# Patient Record
Sex: FEMALE | Race: BLACK OR AFRICAN AMERICAN | Employment: UNEMPLOYED | URBAN - METROPOLITAN AREA
[De-identification: names, ages, dates, MRNs, and addresses within clinical notes are randomized per-mention and may not be internally consistent; named-entity substitution may affect disease eponyms.]

---

## 2019-11-14 ENCOUNTER — OFFICE VISIT (OUTPATIENT)
Dept: CARDIOLOGY CLINIC | Age: 64
End: 2019-11-14

## 2019-11-14 VITALS
SYSTOLIC BLOOD PRESSURE: 113 MMHG | OXYGEN SATURATION: 99 % | BODY MASS INDEX: 22.49 KG/M2 | HEIGHT: 65 IN | DIASTOLIC BLOOD PRESSURE: 78 MMHG | HEART RATE: 99 BPM | WEIGHT: 135 LBS

## 2019-11-14 DIAGNOSIS — R00.2 PALPITATIONS: Primary | ICD-10-CM

## 2019-11-14 DIAGNOSIS — I48.91 ATRIAL FIBRILLATION, UNSPECIFIED TYPE (HCC): ICD-10-CM

## 2019-11-14 RX ORDER — GLUCOSAMINE SULFATE 1500 MG
POWDER IN PACKET (EA) ORAL DAILY
COMMUNITY
End: 2020-10-29

## 2019-11-14 RX ORDER — METOPROLOL TARTRATE 25 MG/1
12.5 TABLET, FILM COATED ORAL 2 TIMES DAILY
Qty: 30 TAB | Refills: 3 | Status: SHIPPED | OUTPATIENT
Start: 2019-11-14 | End: 2019-12-09 | Stop reason: ALTCHOICE

## 2019-11-14 RX ORDER — LEVOCETIRIZINE DIHYDROCHLORIDE 5 MG/1
TABLET, FILM COATED ORAL
COMMUNITY

## 2019-11-14 NOTE — PROGRESS NOTES
HISTORY OF PRESENT ILLNESS  Onesimo Pritchett is a 71 y.o. female. Palpitations    The history is provided by the patient. This is a new problem. The current episode started more than 1 week ago (7/19). The problem has been gradually worsening (few weeks). The problem occurs daily. On average, each episode lasts 2 minutes. The problem is associated with nothing (ADLs). Associated symptoms include diaphoresis (occasional mild), nausea, dizziness and shortness of breath. Pertinent negatives include no fever, no malaise/fatigue, no chest pain, no claudication, no orthopnea, no PND, no vomiting, no headaches and no cough. Review of Systems   Constitutional: Positive for diaphoresis (occasional mild). Negative for chills, fever, malaise/fatigue and weight loss. HENT: Negative for nosebleeds. Eyes: Negative for discharge. Respiratory: Positive for shortness of breath. Negative for cough and wheezing. Cardiovascular: Positive for palpitations. Negative for chest pain, orthopnea, claudication, leg swelling and PND. Gastrointestinal: Positive for nausea. Negative for diarrhea and vomiting. Genitourinary: Negative for dysuria and hematuria. Musculoskeletal: Negative for joint pain. Skin: Negative for rash. Neurological: Positive for dizziness. Negative for seizures, loss of consciousness and headaches. Endo/Heme/Allergies: Negative for polydipsia. Does not bruise/bleed easily. Psychiatric/Behavioral: Negative for depression and substance abuse. The patient does not have insomnia. No Known Allergies    No past medical history on file.     Family History   Problem Relation Age of Onset    Stroke Brother 47    Heart Attack Neg Hx        Social History     Tobacco Use    Smoking status: Never Smoker    Smokeless tobacco: Never Used   Substance Use Topics    Alcohol use: Not Currently     Frequency: Never    Drug use: Not Currently        Current Outpatient Medications   Medication Sig    levocetirizine (XYZAL) 5 mg tablet Take  by mouth.  cholecalciferol (VITAMIN D3) 1,000 unit cap Take  by mouth daily. No current facility-administered medications for this visit. Past Surgical History:   Procedure Laterality Date    HX LAP CHOLECYSTECTOMY         Visit Vitals  /78 (BP 1 Location: Left arm, BP Patient Position: Sitting)   Pulse 99   Ht 5' 5\" (1.651 m)   Wt 61.2 kg (135 lb)   SpO2 99%   BMI 22.47 kg/m²       Diagnostic Studies:  I have reviewed the relevant tests done on the patient and show as follows  EKG tracings reviewed by me today. EKG Results     Procedure 720 Value Units Date/Time    AMB POC EKG ROUTINE W/ 12 LEADS, INTER & REP [[id number]]     Order Status:  Sent         XR Results (most recent):  No results found for this or any previous visit. No flowsheet data found. Ms. Alexandr Baez has a reminder for a \"due or due soon\" health maintenance. I have asked that she contact her primary care provider for follow-up on this health maintenance. Physical Exam   Constitutional: She is oriented to person, place, and time. She appears well-developed and well-nourished. No distress. HENT:   Head: Normocephalic and atraumatic. Mouth/Throat: Normal dentition. Eyes: Right eye exhibits no discharge. Left eye exhibits no discharge. No scleral icterus. Neck: Neck supple. No JVD present. Carotid bruit is not present. No thyromegaly present. Cardiovascular: Normal rate, regular rhythm, S1 normal, S2 normal, normal heart sounds and intact distal pulses. Exam reveals no gallop and no friction rub. No murmur heard. Pulmonary/Chest: Effort normal and breath sounds normal. She has no wheezes. She has no rales. Abdominal: Soft. She exhibits no mass. There is no tenderness. Musculoskeletal: She exhibits no edema. Lymphadenopathy:        Right cervical: No superficial cervical adenopathy present.        Left cervical: No superficial cervical adenopathy present. Neurological: She is alert and oriented to person, place, and time. Skin: Skin is warm and dry. No rash noted. Psychiatric: She has a normal mood and affect. Her behavior is normal.       ASSESSMENT and PLAN    Atrial Fibrillation CHADSVASC2 Score Stroke Risk:   71 y.o. 72 to 76  +1   female Female +1   CHF HX: No    + 0   HTN HX: No    + 0   Stroke/TIA/Thromboembolism No    +0   Vascular Disease HX: No    + 0   Diabetes Mellitus No    + 0   CHADSVASC 2 Score 2      Annual Stroke Risk 2.2%- moderate-high      Has chronic intermittent and possibly persistent atrial fibrillation for last 3 months or so clinically. Intermittent symptoms of shortness of breath nausea mild sweating. Will get cardiac work-up with echo and a stress test.  Start low-dose beta-blockers to control the rate and Eliquis to reduce the risk of stroke. Discussed with patient the risk of bleeding on anticoagulation. Patient understands. Patient also understands the reduced risk of stroke with anticoagulation due to atrial fibrillation. Diagnoses and all orders for this visit:    1. Palpitations  -     AMB POC EKG ROUTINE W/ 12 LEADS, INTER & REP    2. Atrial fibrillation, unspecified type (HCC)  -     metoprolol tartrate (LOPRESSOR) 25 mg tablet; Take 0.5 Tabs by mouth two (2) times a day. -     ECHO ADULT COMPLETE; Future  -     NUCLEAR CARDIAC STRESS TEST; Future  -     apixaban (ELIQUIS) 5 mg tablet; Take 1 Tab by mouth two (2) times a day. Pertinent laboratory and test data reviewed and discussed with patient. See patient instructions also for other medical advice given    There are no discontinued medications.     Follow-up and Dispositions    · Return in about 1 month (around 12/14/2019), or if symptoms worsen or fail to improve, for post test.

## 2019-11-14 NOTE — PROGRESS NOTES
1. Have you been to the ER, urgent care clinic since your last visit? Hospitalized since your last visit? No  When:  Where:  Reason for visit:     2. Have you seen or consulted any other health care providers outside of the [address] [place] [address] since your last visit? Include any pap smears or colon screening. No     3. Since your last visit, have you had any of the following symptoms?      palpitations, shortness of breath and dizziness. Explain: sx have been constant lately    4. Have you had any blood work, X-rays or cardiac testing? No     Requested: NO     [facility]    5. Where do you normally have your labs drawn?   [facility]    6. Do you need any refills today?    no

## 2019-11-14 NOTE — PATIENT INSTRUCTIONS
There are no discontinued medications. After the recommended changes have been made in blood pressure medicines, patient advised to keep BP/HR(pulse rate) chart twice daily and bring us results in next 2 weeks or so. Patient may send the results via \"My Chart\" if desired. Please rest for 5-10 minutes before checking blood pressure Atrial Fibrillation: Care Instructions Your Care Instructions Atrial fibrillation is an irregular and often fast heartbeat. Treating this condition is important for several reasons. It can cause blood clots, which can travel from your heart to your brain and cause a stroke. If you have a fast heartbeat, you may feel lightheaded, dizzy, and weak. An irregular heartbeat can also increase your risk for heart failure. Atrial fibrillation is often the result of another heart condition, such as high blood pressure or coronary artery disease. Making changes to improve your heart condition will help you stay healthy and active. Follow-up care is a key part of your treatment and safety. Be sure to make and go to all appointments, and call your doctor if you are having problems. It's also a good idea to know your test results and keep a list of the medicines you take. How can you care for yourself at home? Medicines 
  · Take your medicines exactly as prescribed. Call your doctor if you think you are having a problem with your medicine. You will get more details on the specific medicines your doctor prescribes.  
  · If your doctor has given you a blood thinner to prevent a stroke, be sure you get instructions about how to take your medicine safely. Blood thinners can cause serious bleeding problems.  
  · Do not take any vitamins, over-the-counter drugs, or herbal products without talking to your doctor first.  
 Lifestyle changes 
  · Do not smoke. Smoking can increase your chance of a stroke and heart attack.  If you need help quitting, talk to your doctor about stop-smoking programs and medicines. These can increase your chances of quitting for good.  
  · Eat a heart-healthy diet.  
  · Stay at a healthy weight. Lose weight if you need to.  
  · Limit alcohol to 2 drinks a day for men and 1 drink a day for women. Too much alcohol can cause health problems.  
  · Avoid colds and flu. Get a pneumococcal vaccine shot. If you have had one before, ask your doctor whether you need another dose. Get a flu shot every year. If you must be around people with colds or flu, wash your hands often. Activity 
  · If your doctor recommends it, get more exercise. Walking is a good choice. Bit by bit, increase the amount you walk every day. Try for at least 30 minutes on most days of the week. You also may want to swim, bike, or do other activities. Your doctor may suggest that you join a cardiac rehabilitation program so that you can have help increasing your physical activity safely.  
  · Start light exercise if your doctor says it is okay. Even a small amount will help you get stronger, have more energy, and manage stress. Walking is an easy way to get exercise. Start out by walking a little more than you did in the hospital. Gradually increase the amount you walk.  
  · When you exercise, watch for signs that your heart is working too hard. You are pushing too hard if you cannot talk while you are exercising. If you become short of breath or dizzy or have chest pain, sit down and rest immediately.  
  · Check your pulse regularly. Place two fingers on the artery at the palm side of your wrist, in line with your thumb. If your heartbeat seems uneven or fast, talk to your doctor. When should you call for help? Call 911 anytime you think you may need emergency care. For example, call if: 
  · You have symptoms of a heart attack. These may include: 
? Chest pain or pressure, or a strange feeling in the chest. 
? Sweating. ? Shortness of breath. ? Nausea or vomiting. ? Pain, pressure, or a strange feeling in the back, neck, jaw, or upper belly or in one or both shoulders or arms. ? Lightheadedness or sudden weakness. ? A fast or irregular heartbeat. After you call 911, the  may tell you to chew 1 adult-strength or 2 to 4 low-dose aspirin. Wait for an ambulance. Do not try to drive yourself.  
  · You have symptoms of a stroke. These may include: 
? Sudden numbness, tingling, weakness, or loss of movement in your face, arm, or leg, especially on only one side of your body. ? Sudden vision changes. ? Sudden trouble speaking. ? Sudden confusion or trouble understanding simple statements. ? Sudden problems with walking or balance. ? A sudden, severe headache that is different from past headaches.  
  · You passed out (lost consciousness).  
 Call your doctor now or seek immediate medical care if: 
  · You have new or increased shortness of breath.  
  · You feel dizzy or lightheaded, or you feel like you may faint.  
  · Your heart rate becomes irregular.  
  · You can feel your heart flutter in your chest or skip heartbeats. Tell your doctor if these symptoms are new or worse.  
 Watch closely for changes in your health, and be sure to contact your doctor if you have any problems. Where can you learn more? Go to http://fransico-yolette.info/. Enter U020 in the search box to learn more about \"Atrial Fibrillation: Care Instructions. \" Current as of: April 9, 2019 Content Version: 12.2 © 5124-4674 [employer]. Care instructions adapted under license by [employer] (which disclaims liability or warranty for this information). If you have questions about a medical condition or this instruction, always ask your healthcare professional. [address] any warranty or liability for your use of this information. StepOne Activation Thank you for requesting access to StepOne.  Please follow the instructions below to securely access and download your online medical record. Intrakr allows you to send messages to your doctor, view your test results, renew your prescriptions, schedule appointments, and more. How Do I Sign Up? 1. In your internet browser, go to [email or web address]. 2. Click on the First Time User? Click Here link in the Sign In box. You will see the New Member Sign Up page. 3. Enter your Intrakr Access Code exactly as it appears below. You will not need to use this code after youve completed the sign-up process. If you do not sign up before the expiration date, you must request a new code. Intrakr Access Code: [email or web address] Expires: 2019  9:08 AM (This is the date your Intrakr access code will ) 4. Enter the last four digits of your Social Security Number (xxxx) and Date of Birth (mm/dd/yyyy) as indicated and click Submit. You will be taken to the next sign-up page. 5. Create a Intrakr ID. This will be your Intrakr login ID and cannot be changed, so think of one that is secure and easy to remember. 6. Create a Intrakr password. You can change your password at any time. 7. Enter your Password Reset Question and Answer. This can be used at a later time if you forget your password. 8. Enter your e-mail address. You will receive e-mail notification when new information is available in [address] 9. Click Sign Up. You can now view and download portions of your medical record. 10. Click the Download Summary menu link to download a portable copy of your medical information. Additional Information If you have questions, please visit the Frequently Asked Questions section of the Intrakr website at [email or web address] Remember, Intrakr is NOT to be used for urgent needs. For medical emergencies, dial 911.

## 2019-12-03 ENCOUNTER — TELEPHONE (OUTPATIENT)
Dept: CARDIOLOGY CLINIC | Age: 64
End: 2019-12-03

## 2019-12-03 NOTE — TELEPHONE ENCOUNTER
Please get HR/BP twice daily at home for 3 days and show me. If she gets severely dizzy or nauseous, may need to come to emergency room.

## 2019-12-03 NOTE — TELEPHONE ENCOUNTER
called and spoke with patient. Patient advised to keep BP and HR bid x 3 days and bring to office. Also advised patient to go to ER if severe dizziness. Patient states understanding.

## 2019-12-03 NOTE — TELEPHONE ENCOUNTER
Patient in office for nuclear stress test, before leaving stated ever since has started taking metoprolol 12.5 mg BID has been dizzy, which has happened 3 times now and having slight nausea. Please advise.

## 2019-12-09 ENCOUNTER — OFFICE VISIT (OUTPATIENT)
Dept: CARDIOLOGY CLINIC | Age: 64
End: 2019-12-09

## 2019-12-09 VITALS
HEIGHT: 65 IN | DIASTOLIC BLOOD PRESSURE: 81 MMHG | WEIGHT: 138 LBS | BODY MASS INDEX: 22.99 KG/M2 | HEART RATE: 90 BPM | SYSTOLIC BLOOD PRESSURE: 125 MMHG

## 2019-12-09 DIAGNOSIS — I48.91 ATRIAL FIBRILLATION, UNSPECIFIED TYPE (HCC): ICD-10-CM

## 2019-12-09 DIAGNOSIS — I34.0 NONRHEUMATIC MITRAL VALVE REGURGITATION: ICD-10-CM

## 2019-12-09 DIAGNOSIS — I50.43 ACUTE ON CHRONIC COMBINED SYSTOLIC AND DIASTOLIC CONGESTIVE HEART FAILURE (HCC): Primary | ICD-10-CM

## 2019-12-09 RX ORDER — FUROSEMIDE 20 MG/1
20 TABLET ORAL
Qty: 30 TAB | Refills: 1 | Status: SHIPPED | OUTPATIENT
Start: 2019-12-09 | End: 2020-07-06 | Stop reason: SDUPTHER

## 2019-12-09 RX ORDER — CARVEDILOL 6.25 MG/1
6.25 TABLET ORAL 2 TIMES DAILY WITH MEALS
Qty: 60 TAB | Refills: 3 | Status: SHIPPED | OUTPATIENT
Start: 2019-12-09 | End: 2019-12-19

## 2019-12-09 NOTE — PROGRESS NOTES
HISTORY OF PRESENT ILLNESS  Onesimo Pritchett is a 71 y.o. female. Palpitations    The history is provided by the patient. This is a new problem. The current episode started more than 1 week ago (7/19). The problem has been gradually worsening (few weeks). The problem occurs daily. On average, each episode lasts 2 minutes. The problem is associated with nothing (ADLs). Associated symptoms include malaise/fatigue, nausea, dizziness and shortness of breath. Pertinent negatives include no diaphoresis, no fever, no chest pain, no claudication, no orthopnea, no PND, no vomiting, no headaches and no cough. Shortness of Breath   The history is provided by the patient. This is a new problem. The problem occurs intermittently. The current episode started more than 1 week ago. Pertinent negatives include no fever, no headaches, no cough, no wheezing, no PND, no orthopnea, no chest pain, no vomiting, no rash, no leg swelling and no claudication. The problem's precipitants include exercise (Walking within the house). Review of Systems   Constitutional: Positive for malaise/fatigue. Negative for chills, diaphoresis, fever and weight loss. HENT: Negative for nosebleeds. Eyes: Negative for discharge. Respiratory: Positive for shortness of breath. Negative for cough and wheezing. Cardiovascular: Positive for palpitations. Negative for chest pain, orthopnea, claudication, leg swelling and PND. Gastrointestinal: Positive for nausea. Negative for diarrhea and vomiting. Genitourinary: Negative for dysuria and hematuria. Musculoskeletal: Negative for joint pain. Skin: Negative for rash. Neurological: Positive for dizziness. Negative for seizures, loss of consciousness and headaches. Endo/Heme/Allergies: Negative for polydipsia. Does not bruise/bleed easily. Psychiatric/Behavioral: Negative for depression and substance abuse. The patient does not have insomnia. No Known Allergies    History reviewed.  No pertinent past medical history. Family History   Problem Relation Age of Onset    Stroke Brother 47    Heart Attack Neg Hx        Social History     Tobacco Use    Smoking status: Never Smoker    Smokeless tobacco: Never Used   Substance Use Topics    Alcohol use: Not Currently     Frequency: Never    Drug use: Not Currently        Current Outpatient Medications   Medication Sig    levocetirizine (XYZAL) 5 mg tablet Take  by mouth.  cholecalciferol (VITAMIN D3) 1,000 unit cap Take  by mouth daily.  metoprolol tartrate (LOPRESSOR) 25 mg tablet Take 0.5 Tabs by mouth two (2) times a day.  apixaban (ELIQUIS) 5 mg tablet Take 1 Tab by mouth two (2) times a day. No current facility-administered medications for this visit. Past Surgical History:   Procedure Laterality Date    HX LAP CHOLECYSTECTOMY         Visit Vitals  /81   Pulse 90   Ht 5' 5\" (1.651 m)   Wt 62.6 kg (138 lb)   BMI 22.96 kg/m²       Diagnostic Studies:  I have reviewed the relevant tests done on the patient and show as follows  EKG tracings reviewed by me today. EKG Results     None        XR Results (most recent):  No results found for this or any previous visit. 12/03/19   ECHO ADULT COMPLETE 12/03/2019 12/3/2019    Narrative · Left Ventricle: Normal cavity size and wall thickness. Mild systolic   dysfunction. Estimated left ventricular ejection fraction is 46 - 50%. Abnormal left ventricular wall motion. · Left Atrium: Severely dilated left atrium. · Right Ventricle: Moderately dilated right ventricle. Borderline low   systolic function. · Right Atrium: Moderately dilated right atrium. · Mitral Valve: Mitral valve thickening. Moderate mitral valve   regurgitation is present. · Tricuspid Valve: Mild tricuspid valve regurgitation is present. · Pulmonary Artery: There is no evidence of pulmonary hypertension. · Pulmonic Valve: Mild pulmonic valve regurgitation is present.   · IVC/Hepatic Veins: Mildly elevated central venous pressure (5-10 mmHg);   IVC diameter is less than 21 mm and collapses less than 50% with   respiration. Signed by: Corry Ahmadi MD     12/03/19   NUCLEAR CARDIAC STRESS TEST 12/03/2019 12/4/2019    Narrative · Gated SPECT: Left ventricular function post-stress was normal.   Calculated ejection fraction is 57%. There is no evidence of transient   ischemic dilation (TID). The TID ratio is 0.8. · Baseline ECG: Atrial fibrillation, non-specific ST-T wave abnormalities. · Negative stress test.  · Left ventricular perfusion is normal.  · Negative myocardial perfusion imaging. Myocardial perfusion imaging   supports a low risk stress test.        Signed by: Andres Crane MD                 No flowsheet data found. Ms. Magy Delgado has a reminder for a \"due or due soon\" health maintenance. I have asked that she contact her primary care provider for follow-up on this health maintenance. Physical Exam   Constitutional: She is oriented to person, place, and time. She appears well-developed and well-nourished. No distress. HENT:   Head: Normocephalic and atraumatic. Mouth/Throat: Normal dentition. Eyes: Right eye exhibits no discharge. Left eye exhibits no discharge. No scleral icterus. Neck: Neck supple. No JVD present. Carotid bruit is not present. No thyromegaly present. Cardiovascular: Normal rate, S1 normal, S2 normal, normal heart sounds and intact distal pulses. An irregularly irregular rhythm present. Exam reveals no gallop and no friction rub. No murmur heard. Pulmonary/Chest: Effort normal and breath sounds normal. She has no wheezes. She has no rales. Abdominal: Soft. She exhibits no mass. There is no tenderness. Musculoskeletal:         General: No edema. Lymphadenopathy:        Right cervical: No superficial cervical adenopathy present. Left cervical: No superficial cervical adenopathy present.    Neurological: She is alert and oriented to person, place, and time. Skin: Skin is warm and dry. No rash noted. Psychiatric: She has a normal mood and affect. Her behavior is normal.       ASSESSMENT and PLAN    Atrial Fibrillation CHADSVASC2 Score Stroke Risk:   71 y.o. 72 to 76  +1   female Female +1   CHF HX: No    + 0   HTN HX: No    + 0   Stroke/TIA/Thromboembolism No    +0   Vascular Disease HX: No    + 0   Diabetes Mellitus No    + 0   CHADSVASC 2 Score 2      Annual Stroke Risk 2.2%- moderate-high        MR: 12/19 mild to moderate  Cardiomyopathy: 12/19 45 to 50% EF, dilated LA, RA, RV      Has chronic intermittent and possibly persistent atrial fibrillation for last 3 months or so clinically. Intermittent symptoms of shortness of breath nausea mild sweating. Found to have cardiomyopathy with mild to moderate MR and dilated left atrium, RA and RV. Possible myocarditis as she had a prolonged cough before this all started. Start low-dose diuretics. Change metoprolol to carvedilol. Follow-up BP/HR. Will consider cardiac cath, AMRITA depending on the response. Discussed with patient the expected prognosis and if it worsens, she will come to emergency room as needed. Discussed also that she may need mitral valve repair in future. Diagnoses and all orders for this visit:    1. Acute on chronic combined systolic and diastolic congestive heart failure (HCC)  -     furosemide (LASIX) 20 mg tablet; Take 1 Tab by mouth daily as needed (Shortness of breath). Skip if SBP less than 270  -     METABOLIC PANEL, BASIC; Future    2. Atrial fibrillation, unspecified type (HCC)  -     carvedilol (COREG) 6.25 mg tablet; Take 1 Tab by mouth two (2) times daily (with meals). 3. Nonrheumatic mitral valve regurgitation        Pertinent laboratory and test data reviewed and discussed with patient.   See patient instructions also for other medical advice given    Medications Discontinued During This Encounter   Medication Reason    metoprolol tartrate (LOPRESSOR) 25 mg tablet Alternate Therapy       Follow-up and Dispositions    · Return in about 2 weeks (around 12/23/2019), or if symptoms worsen or fail to improve, for post test.

## 2019-12-09 NOTE — PROGRESS NOTES
1. Have you been to the ER, urgent care clinic since your last visit? Hospitalized since your last visit? No     2. Have you seen or consulted any other health care providers outside of the [address] [place] [address] since your last visit? Include any pap smears or colon screening. No     3. Since your last visit, have you had any of the following symptoms? shortness of breath. 4.  Have you had any blood work, X-rays or cardiac testing? No         5. Where do you normally have your labs drawn? PCP Office    6. Do you need any refills today?    No

## 2019-12-09 NOTE — PATIENT INSTRUCTIONS
Medications Discontinued During This Encounter   Medication Reason    metoprolol tartrate (LOPRESSOR) 25 mg tablet Alternate Therapy     After the recommended changes have been made in blood pressure medicines, patient advised to keep BP/HR(pulse rate) chart twice daily and bring us results in next office visit. Patient may send the results via \"My Chart\" if desired. Please rest for 5-10 minutes before checking blood pressure       Avoiding Triggers With Heart Failure: Care Instructions  Your Care Instructions    Triggers are anything that make your heart failure flare up. A flare-up is also called \"sudden heart failure\" or \"acute heart failure. \" When you have a flare-up, fluid builds up in your lungs, and you have problems breathing. You might need to go to the hospital. By watching for changes in your condition and avoiding triggers, you can prevent heart failure flare-ups. Follow-up care is a key part of your treatment and safety. Be sure to make and go to all appointments, and call your doctor if you are having problems. It's also a good idea to know your test results and keep a list of the medicines you take. How can you care for yourself at home? Watch for changes in your weight and condition  · Weigh yourself without clothing at the same time each day. Record your weight. Call your doctor if you have sudden weight gain, such as more than 2 to 3 pounds in a day or 5 pounds in a week. (Your doctor may suggest a different range of weight gain.) A sudden weight gain may mean that your heart failure is getting worse. · Keep a daily record of your symptoms. Write down any changes in how you feel, such as new shortness of breath, cough, or problems eating. Also record if your ankles are more swollen than usual and if you feel more tired than usual. Note anything that you ate or did that could have triggered these changes. Limit sodium  Sodium causes your body to hold on to extra water.  This may cause your heart failure symptoms to get worse. People get most of their sodium from processed foods. Fast food and restaurant meals also tend to be very high in sodium. · Your doctor may suggest that you limit sodium to 2,000 milligrams (mg) a day or less. That is less than 1 teaspoon of salt a day, including all the salt you eat in cooking or in packaged foods. · Read food labels on cans and food packages. They tell you how much sodium you get in one serving. Check the serving size. If you eat more than one serving, you are getting more sodium. · Be aware that sodium can come in forms other than salt, including monosodium glutamate (MSG), sodium citrate, and sodium bicarbonate (baking soda). MSG is often added to Asian food. You can sometimes ask for food without MSG or salt. · Slowly reducing salt will help you adjust to the taste. Take the salt shaker off the table. · Flavor your food with garlic, lemon juice, onion, vinegar, herbs, and spices instead of salt. Do not use soy sauce, steak sauce, onion salt, garlic salt, mustard, or ketchup on your food, unless it is labeled \"low-sodium\" or \"low-salt. \"  · Make your own salad dressings, sauces, and ketchup without adding salt. · Use fresh or frozen ingredients, instead of canned ones, whenever you can. Choose low-sodium canned goods. · Eat less processed food and food from restaurants, including fast food. Exercise as directed  Moderate, regular exercise is very good for your heart. It improves your blood flow and helps control your weight. But too much exercise can stress your heart and cause a heart failure flare-up. · Check with your doctor before you start an exercise program.  · Walking is an easy way to get exercise. Start out slowly. Gradually increase the length and pace of your walk. Swimming, riding a bike, and using a treadmill are also good forms of exercise. · When you exercise, watch for signs that your heart is working too hard.  You are pushing yourself too hard if you cannot talk while you are exercising. If you become short of breath or dizzy or have chest pain, stop, sit down, and rest.  · Do not exercise when you do not feel well. Take medicines correctly  · Take your medicines exactly as prescribed. Call your doctor if you think you are having a problem with your medicine. · Make a list of all the medicines you take. Include those prescribed to you by other doctors and any over-the-counter medicines, vitamins, or supplements you take. Take this list with you when you go to any doctor. · Take your medicines at the same time every day. It may help you to post a list of all the medicines you take every day and what time of day you take them. · Make taking your medicine as simple as you can. Plan times to take your medicines when you are doing other things, such as eating a meal or getting ready for bed. This will make it easier to remember to take your medicines. · Get organized. Use helpful tools, such as daily or weekly pill containers. When should you call for help? Call 911 if you have symptoms of sudden heart failure such as:    · You have severe trouble breathing.     · You cough up pink, foamy mucus.     · You have a new irregular or rapid heartbeat.    Call your doctor now or seek immediate medical care if:    · You have new or increased shortness of breath.     · You are dizzy or lightheaded, or you feel like you may faint.     · You have sudden weight gain, such as more than 2 to 3 pounds in a day or 5 pounds in a week. (Your doctor may suggest a different range of weight gain.)     · You have increased swelling in your legs, ankles, or feet.     · You are suddenly so tired or weak that you cannot do your usual activities.    Watch closely for changes in your health, and be sure to contact your doctor if you develop new symptoms. Where can you learn more? Go to http://fransico-yolette.info/.   Enter B067 in the search box to learn more about \"Avoiding Triggers With Heart Failure: Care Instructions. \"  Current as of: 2019  Content Version: 12.2  © 9405-0138 [employer]. Care instructions adapted under license by [employer] (which disclaims liability or warranty for this information). If you have questions about a medical condition or this instruction, always ask your healthcare professional. [address] any warranty or liability for your use of this information. [employer] you Activation    Thank you for requesting access to [employer]. Please follow the instructions below to securely access and download your online medical record. [employer] allows you to send messages to your doctor, view your test results, renew your prescriptions, schedule appointments, and more. How Do I Sign Up? 1. In your internet browser, go to [email or web address]. 2. Click on the First Time User? Click Here link in the Sign In box. You will see the New Member Sign Up page. 3. Enter your [employer] Access Code exactly as it appears below. You will not need to use this code after youve completed the sign-up process. If you do not sign up before the expiration date, you must request a new code. [employer] Access Code: [email or web address]  Expires: 2019  9:08 AM (This is the date your [employer] access code will )    4. Enter the last four digits of your Social Security Number (xxxx) and Date of Birth (mm/dd/yyyy) as indicated and click Submit. You will be taken to the next sign-up page. 5. Create a Social[employer] ID. This will be your [employer] login ID and cannot be changed, so think of one that is secure and easy to remember. 6. Create a [employer] password. You can change your password at any time. 7. Enter your Password Reset Question and Answer. This can be used at a later time if you forget your password. 8. Enter your e-mail address.  You will receive e-mail notification when new information is available in [facility]. 9. Click Sign Up. You can now view and download portions of your medical record. 10. Click the Download Summary menu link to download a portable copy of your medical information. Additional Information    If you have questions, please visit the Frequently Asked Questions section of the [employer] website at [email or web address] Remember, [employer] is NOT to be used for urgent needs. For medical emergencies, dial 911.

## 2019-12-18 DIAGNOSIS — R00.2 PALPITATIONS: Primary | ICD-10-CM

## 2019-12-18 NOTE — TELEPHONE ENCOUNTER
Spoke to patient per Dr. Lino Valdovinos to add digoxin 0.125 mg daily regarding BP log She voices understanding and acceptance of this advice and will call back if any further questions or concerns.

## 2019-12-18 NOTE — TELEPHONE ENCOUNTER
Requested Prescriptions     Pending Prescriptions Disp Refills    digoxin (LANOXIN) 0.125 mg tablet  2     Sig: Take 1 Tab by mouth daily.

## 2019-12-19 ENCOUNTER — TELEPHONE (OUTPATIENT)
Dept: CARDIOLOGY CLINIC | Age: 64
End: 2019-12-19

## 2019-12-19 DIAGNOSIS — I48.91 ATRIAL FIBRILLATION, UNSPECIFIED TYPE (HCC): ICD-10-CM

## 2019-12-19 RX ORDER — CARVEDILOL 12.5 MG/1
12.5 TABLET ORAL 2 TIMES DAILY WITH MEALS
Qty: 180 TAB | Refills: 1 | Status: SHIPPED | OUTPATIENT
Start: 2019-12-19 | End: 2020-07-06 | Stop reason: SDUPTHER

## 2019-12-19 RX ORDER — DIGOXIN 125 MCG
0.12 TABLET ORAL DAILY
Qty: 30 TAB | Refills: 2 | Status: SHIPPED | OUTPATIENT
Start: 2019-12-19 | End: 2020-04-03 | Stop reason: SDUPTHER

## 2019-12-19 NOTE — TELEPHONE ENCOUNTER
Medicines and blood pressure reviewed. Increase carvedilol to 12.5 mg twice a day. I have sent a prescription to his pharmacy.

## 2019-12-19 NOTE — TELEPHONE ENCOUNTER
----- Message from Shaina. Chicho sent at 12/9/2019  3:28 PM EST -----  Regarding: CMP  Please review patients blood pressures and medications for CMP. Patient to bring in on Wednesday.

## 2019-12-19 NOTE — TELEPHONE ENCOUNTER
Left message on patients voicemail informing patient that her coreg has been increased to 12.5 mg BID and has been sent in to her Pharmacy, and to call the office if she has any questions or concerns.

## 2020-01-20 ENCOUNTER — OFFICE VISIT (OUTPATIENT)
Dept: CARDIOLOGY CLINIC | Age: 65
End: 2020-01-20

## 2020-01-20 VITALS
SYSTOLIC BLOOD PRESSURE: 137 MMHG | HEIGHT: 65 IN | HEART RATE: 76 BPM | BODY MASS INDEX: 21.99 KG/M2 | WEIGHT: 132 LBS | DIASTOLIC BLOOD PRESSURE: 79 MMHG

## 2020-01-20 DIAGNOSIS — I48.19 OTHER PERSISTENT ATRIAL FIBRILLATION (HCC): ICD-10-CM

## 2020-01-20 DIAGNOSIS — I34.0 NONRHEUMATIC MITRAL VALVE REGURGITATION: ICD-10-CM

## 2020-01-20 DIAGNOSIS — I50.43 ACUTE ON CHRONIC COMBINED SYSTOLIC AND DIASTOLIC CONGESTIVE HEART FAILURE (HCC): Primary | ICD-10-CM

## 2020-01-20 NOTE — PROGRESS NOTES
HISTORY OF PRESENT ILLNESS  Michael Guerrero is a 71 y.o. female. Palpitations    The history is provided by the patient. This is a new problem. The current episode started more than 1 week ago (7/19). The problem has been gradually improving. The problem occurs daily. On average, each episode lasts 2 minutes. The problem is associated with nothing (ADLs). Associated symptoms include malaise/fatigue, nausea, dizziness and shortness of breath. Pertinent negatives include no diaphoresis, no fever, no chest pain, no claudication, no orthopnea, no PND, no vomiting, no headaches and no cough. Shortness of Breath   The history is provided by the patient. This is a new problem. The problem occurs intermittently. The current episode started more than 1 week ago. The problem has been gradually improving. Pertinent negatives include no fever, no headaches, no cough, no wheezing, no PND, no orthopnea, no chest pain, no vomiting, no rash, no leg swelling and no claudication. The problem's precipitants include exercise (Walking within the house; 1/20 steps). Review of Systems   Constitutional: Positive for malaise/fatigue. Negative for chills, diaphoresis, fever and weight loss. HENT: Negative for nosebleeds. Eyes: Negative for discharge. Respiratory: Positive for shortness of breath. Negative for cough and wheezing. Cardiovascular: Positive for palpitations. Negative for chest pain, orthopnea, claudication, leg swelling and PND. Gastrointestinal: Positive for nausea. Negative for diarrhea and vomiting. Genitourinary: Negative for dysuria and hematuria. Musculoskeletal: Negative for joint pain. Skin: Negative for rash. Neurological: Positive for dizziness. Negative for seizures, loss of consciousness and headaches. Endo/Heme/Allergies: Negative for polydipsia. Does not bruise/bleed easily. Psychiatric/Behavioral: Negative for depression and substance abuse. The patient does not have insomnia.       No Known Allergies    History reviewed. No pertinent past medical history. Family History   Problem Relation Age of Onset    Stroke Brother 47    Heart Attack Neg Hx        Social History     Tobacco Use    Smoking status: Never Smoker    Smokeless tobacco: Never Used   Substance Use Topics    Alcohol use: Not Currently     Frequency: Never    Drug use: Not Currently        Current Outpatient Medications   Medication Sig    digoxin (LANOXIN) 0.125 mg tablet Take 1 Tab by mouth daily.  carvedilol (COREG) 12.5 mg tablet Take 1 Tab by mouth two (2) times daily (with meals).  furosemide (LASIX) 20 mg tablet Take 1 Tab by mouth daily as needed (Shortness of breath). Skip if SBP less than 105    levocetirizine (XYZAL) 5 mg tablet Take  by mouth.  cholecalciferol (VITAMIN D3) 1,000 unit cap Take  by mouth daily.  apixaban (ELIQUIS) 5 mg tablet Take 1 Tab by mouth two (2) times a day. No current facility-administered medications for this visit. Past Surgical History:   Procedure Laterality Date    HX LAP CHOLECYSTECTOMY         Visit Vitals  /79   Pulse 76   Ht 5' 5\" (1.651 m)   Wt 59.9 kg (132 lb)   BMI 21.97 kg/m²       Diagnostic Studies:  I have reviewed the relevant tests done on the patient and show as follows  EKG tracings reviewed by me today. EKG Results     None        XR Results (most recent):  No results found for this or any previous visit. 12/03/19   ECHO ADULT COMPLETE 12/03/2019 12/3/2019    Narrative · Left Ventricle: Normal cavity size and wall thickness. Mild systolic   dysfunction. Estimated left ventricular ejection fraction is 46 - 50%. Abnormal left ventricular wall motion. · Left Atrium: Severely dilated left atrium. · Right Ventricle: Moderately dilated right ventricle. Borderline low   systolic function. · Right Atrium: Moderately dilated right atrium. · Mitral Valve: Mitral valve thickening. Moderate mitral valve   regurgitation is present.   · Tricuspid Valve: Mild tricuspid valve regurgitation is present. · Pulmonary Artery: There is no evidence of pulmonary hypertension. · Pulmonic Valve: Mild pulmonic valve regurgitation is present. · IVC/Hepatic Veins: Mildly elevated central venous pressure (5-10 mmHg);   IVC diameter is less than 21 mm and collapses less than 50% with   respiration. Signed by: Jina Bowens MD     12/03/19   NUCLEAR CARDIAC STRESS TEST 12/03/2019 12/4/2019    Narrative · Gated SPECT: Left ventricular function post-stress was normal.   Calculated ejection fraction is 57%. There is no evidence of transient   ischemic dilation (TID). The TID ratio is 0.8. · Baseline ECG: Atrial fibrillation, non-specific ST-T wave abnormalities. · Negative stress test.  · Left ventricular perfusion is normal.  · Negative myocardial perfusion imaging. Myocardial perfusion imaging   supports a low risk stress test.        Signed by: Rick Crespo MD                 No flowsheet data found. Ms. Yehuda Foreman has a reminder for a \"due or due soon\" health maintenance. I have asked that she contact her primary care provider for follow-up on this health maintenance. Physical Exam   Constitutional: She is oriented to person, place, and time. She appears well-developed and well-nourished. No distress. HENT:   Head: Normocephalic and atraumatic. Mouth/Throat: Normal dentition. Eyes: Right eye exhibits no discharge. Left eye exhibits no discharge. No scleral icterus. Neck: Neck supple. No JVD present. Carotid bruit is not present. No thyromegaly present. Cardiovascular: Normal rate, S1 normal, S2 normal, normal heart sounds and intact distal pulses. An irregularly irregular rhythm present. Exam reveals no gallop and no friction rub. No murmur heard. Pulmonary/Chest: Effort normal and breath sounds normal. She has no wheezes. She has no rales. Abdominal: Soft. She exhibits no mass. There is no tenderness.    Musculoskeletal: General: No edema. Lymphadenopathy:        Right cervical: No superficial cervical adenopathy present. Left cervical: No superficial cervical adenopathy present. Neurological: She is alert and oriented to person, place, and time. Skin: Skin is warm and dry. No rash noted. Psychiatric: She has a normal mood and affect. Her behavior is normal.       ASSESSMENT and PLAN    Atrial Fibrillation CHADSVASC2 Score Stroke Risk:   71 y.o. 71 to 76  +1   female Female +1   CHF HX: No    + 0   HTN HX: No    + 0   Stroke/TIA/Thromboembolism No    +0   Vascular Disease HX: No    + 0   Diabetes Mellitus No    + 0   CHADSVASC 2 Score 2      Annual Stroke Risk 2.2%- moderate-high        MR: 12/19 mild to moderate  Cardiomyopathy: 12/19 45 to 50% EF, dilated LA, RA, RV      Has chronic intermittent and possibly persistent atrial fibrillation for last 3 months or so clinically. Intermittent symptoms of shortness of breath nausea mild sweating. Found to have cardiomyopathy with mild to moderate MR and dilated left atrium, RA and RV. Possible myocarditis as she had a prolonged cough before this all started. CHF is responding to medications fairly well. Would recommend to hospitalize and start sotalol and do the cardioversion if needed. Discussed with patient but she wants to talk to family and then will get back to us. In the meantime continue the same medications. Diagnoses and all orders for this visit:    1. Acute on chronic combined systolic and diastolic congestive heart failure ([address]    2. Nonrheumatic mitral valve regurgitation    3. Other persistent atrial fibrillation        Pertinent laboratory and test data reviewed and discussed with patient. See patient instructions also for other medical advice given    There are no discontinued medications. Follow-up and Dispositions    · Return in about 6 months (around 7/20/2020), or if symptoms worsen or fail to improve.

## 2020-01-20 NOTE — PROGRESS NOTES
1. Have you been to the ER, urgent care clinic since your last visit? Hospitalized since your last visit? No     2. Have you seen or consulted any other health care providers outside of the [address] [place] [address] since your last visit? Include any pap smears or colon screening. No     3. Since your last visit, have you had any of the following symptoms? .          4. Have you had any blood work, X-rays or cardiac testing? 5. Where do you normally have your labs drawn? PCP Office    6. Do you need any refills today?    No

## 2020-01-20 NOTE — PATIENT INSTRUCTIONS
There are no discontinued medications. Would recommend to start sotalol for which you have to be admitted to the hospital to monitor for the first 2 or 3 days and may need a cardioversion (electrical shock to the heart). Learning About Cardioversion  What is cardioversion? Cardioversion helps your heart return to a normal rhythm. It treats problems like atrial fibrillation. It is also sometimes used in emergencies. It can correct a fast heartbeat that causes low blood pressure, chest pain, or heart failure. There are two types:  · The electrical type uses an electric current. The current enters your body through patches on your chest or back. · The chemical type uses medicines. The medicine is usually put into your arm through a tube called an IV. How is cardioversion done? Your doctor may ask you to take medicines before the treatment. These help prevent blood clots. Your doctor will watch you closely to make sure that there are no problems. Electrical cardioversion  The electrical procedure is done in a hospital. You will get medicine to help you relax and control the pain. Your doctor will put patches on your chest or back. The patches send an electric current to your heart. This resets your heart rhythm. The electrical part takes about 5 minutes. But you will probably be in the hospital for 1 to 2 hours. You will need to recover from the effects of the sedative medicine. Chemical cardioversion  The chemical procedure is most often done in a hospital. In most cases, the medicine is put into your arm through a tube called an IV. But you may get medicines to take by mouth. You may feel a quick sting or pinch when the IV starts. The procedure usually takes about 4 to 8 hours. What can you expect after cardioversion? · You can usually go home the same day. You will need someone to drive you home. · Your doctor may have you take medicines daily.  These help your heart beat normally and prevent blood clots. · After electrical cardioversion, you may have redness where the patches were. This looks and feels like a sunburn. · Abnormal heart rhythms sometimes come back after cardioversion. Follow-up care is a key part of your treatment and safety. Be sure to make and go to all appointments, and call your doctor if you are having problems. It's also a good idea to know your test results and keep a list of the medicines you take. Where can you learn more? Go to http://fransico-yolette.info/. Enter L445 in the search box to learn more about \"Learning About Cardioversion. \"  Current as of: April 9, 2019  Content Version: 12.2  © 3349-0272 [employer]. Care instructions adapted under license by [employer] (which disclaims liability or warranty for this information). If you have questions about a medical condition or this instruction, always ask your healthcare professional. [place] [address] any warranty or liability for your use of this information. Sotalol (By injection)   Sotalol (JOHN-ta-lol)  Used to control rapid heartbeats and abnormal heart rhythms that are serious or life-threatening. It may also be used when patients are not able to take the oral form. This medicine is a beta-blocker and antiarrhythmic. Brand Name(s):   There may be other brand names for this medicine. When This Medicine Should Not Be Used: You should not receive this medicine if you have had an allergic reaction to sotalol. You should not receive this medicine if you have asthma, certain heart problems (such as heart block, slow heartbeat, heart failure), severe kidney disease, or low potassium in the blood. How to Use This Medicine:   Injectable  · Your doctor will prescribe your dose and schedule. This medicine is given through a needle placed in a vein. · A nurse or other health provider will give you this medicine.   Drugs and Foods to Avoid:   Ask your doctor or pharmacist before using any other medicine, including over-the-counter medicines, vitamins, and herbal products. · Make sure your doctor knows if you are also using arsenic trioxide (Trisenox®), cisapride (Propulsid®), dofetilide ([place]), medicine for heart rhythm problems (such as amiodarone, disopyramide, procainamide, quinidine, Cardioquin®, Cordarone®, Norpace®, Procanbid®, or [place]), a phenothiazine medicine (such as prochlorperazine, Compazine®, Mellaril®, Phenergan®, Thorazine®, or Trilafon®), medicine for depression (such as amitriptyline, fluoxetine, nortriptyline, Elavil®, Pamelor®, Prozac®, Sarafem®, or Vivactil®), certain antibiotics (such as erythromycin, levofloxacin, moxifloxacin, sparfloxacin, Avelox®, Levaquin®, or Geoffry Genta), or medicine to treat mental illness (such as haloperidol, mesoridazine, pimozide, prochlorperazine, quetiapine, thioridazine, ziprasidone, Compazine®, Geodon®, Haldol®, Mellaril®, Orap®, Serentil®, or Seroquel®). · Tell your doctor if you are also using albuterol (Ventolin®), clonidine (Catapres®), digoxin (Lanoxin®), guanethidine (Ismelin®), isoproterenol (Isuprel®), reserpine (Harmonyl®), terbutaline (Brethine®, Bricanyl®), insulin or diabetes medicine that you take by mouth (such as glyburide, glipizide, metformin, Actos®, Avandia®, or Glucotrol®), a diuretic or \"water pill\" (such as hydrochlorothiazide [HCTZ], furosemide, or Lasix®), or certain blood pressure medicines (such as atenolol, diltiazem, metoprolol, nifedipine, propranolol, verapamil, Inderal®, Lotrel®, Norvasc®, Toprol®, or Verelan®). Warnings While Using This Medicine:   · Make sure your doctor knows if you are pregnant or breastfeeding, or if you have kidney disease, diabetes, low blood sugar, an overactive thyroid, or low magnesium in the blood.  Tell your doctor if you have heart disease, angina (severe chest pain), low blood pressure, a history of a recent heart attack, or a history of heart failure. · Dizziness, lightheadedness, or fainting may occur, especially when you get up suddenly from a lying or sitting position. These symptoms are more likely to occur when you begin using this medicine, or when the dose is increased. Getting up slowly may help. · This medicine may cause a serious type of allergic reaction called anaphylaxis. Anaphylaxis can be life-threatening and requires immediate medical attention. Tell your doctor right away if you have a rash; itching; hoarseness; trouble breathing; trouble swallowing; or any swelling of your hands, face, or mouth while you are receiving this medicine. · This medicine may cause changes in your blood sugar levels. Also, this medicine may cover up signs of low blood sugar, such as a rapid pulse rate. Check with your doctor if you have these problems or if you notice a change in the results of your blood or urine sugar tests. · Your doctor will do lab tests at regular visits to check on the effects of this medicine. Keep all appointments. Possible Side Effects While Using This Medicine:   Call your doctor right away if you notice any of these side effects:  · Allergic reaction: Itching or hives, swelling in your face or hands, swelling or tingling in your mouth or throat, chest tightness, trouble breathing  · Chest pain. · Dizziness, lightheadedness, or fainting. · Dry mouth, increased thirst, muscle cramps, nausea or vomiting. · Fast, slow, or uneven heartbeat. · Fever, chills, cough, runny or stuffy nose, sore throat, and body aches. · Increased sweating. · Shortness of breath, cold sweat, and bluish-colored skin. · Swelling in your hands, ankles, or feet. · Unusual tiredness or weakness. If you notice these less serious side effects, talk with your doctor:   · Back pain. · Blurred vision or other changes in vision. · Decreased appetite. · Depression, anxiety, or mood changes.   · Diarrhea, nausea, vomiting, heartburn, stomach pain or upset,  · Headache. · Joint or muscle pain. · Pain, itching, burning, swelling, or a lump under your skin where the needle is placed. · Rash. · Trouble sleeping. If you notice other side effects that you think are caused by this medicine, tell your doctor. Call your doctor for medical advice about side effects. You may report side effects to FDA at 1-466-EMM-2250  © 2017 [place] Information is for End User's use only and may not be sold, redistributed or otherwise used for commercial purposes. The above information is an  only. It is not intended as medical advice for individual conditions or treatments. Talk to your doctor, nurse or pharmacist before following any medical regimen to see if it is safe and effective for you. [facility] Activation    Thank you for requesting access to [facility]. Please follow the instructions below to securely access and download your online medical record. [facility] allows you to send messages to your doctor, view your test results, renew your prescriptions, schedule appointments, and more. How Do I Sign Up? 1. In your internet browser, go to [email or web address]. 2. Click on the First Time User? Click Here link in the Sign In box. You will see the New Member Sign Up page. 3. Enter your [facility] Access Code exactly as it appears below. You will not need to use this code after youve completed the sign-up process. If you do not sign up before the expiration date, you must request a new code. [facility] Access Code: [email or web address]  Expires: 3/5/2020 10:58 AM (This is the date your [facility] access code will )    4. Enter the last four digits of your Social Security Number (xxxx) and Date of Birth (mm/dd/yyyy) as indicated and click Submit. You will be taken to the next sign-up page. 5. Create a [facility] ID.  This will be your [facility] login ID and cannot be changed, so think of one that is secure and easy to remember. 6. Create a [facility] password. You can change your password at any time. 7. Enter your Password Reset Question and Answer. This can be used at a later time if you forget your password. 8. Enter your e-mail address. You will receive e-mail notification when new information is available in [address] 9. Click Sign Up. You can now view and download portions of your medical record. 10. Click the Download Summary menu link to download a portable copy of your medical information. Additional Information    If you have questions, please visit the Frequently Asked Questions section of the [employer] website at [email or web address] Remember, [facility] is NOT to be used for urgent needs. For medical emergencies, dial 911.

## 2020-04-02 ENCOUNTER — TELEPHONE (OUTPATIENT)
Dept: CARDIOLOGY CLINIC | Age: 65
End: 2020-04-02

## 2020-04-02 DIAGNOSIS — I50.43 ACUTE ON CHRONIC COMBINED SYSTOLIC AND DIASTOLIC CONGESTIVE HEART FAILURE (HCC): Primary | ICD-10-CM

## 2020-04-02 NOTE — TELEPHONE ENCOUNTER
Patient was added on Digoxin in December 2019 and should like to know if she could continue to take this medications.  Please advise

## 2020-04-03 ENCOUNTER — TELEPHONE (OUTPATIENT)
Dept: CARDIOLOGY CLINIC | Age: 65
End: 2020-04-03

## 2020-04-03 DIAGNOSIS — R00.2 PALPITATIONS: ICD-10-CM

## 2020-04-03 RX ORDER — DIGOXIN 125 MCG
0.12 TABLET ORAL DAILY
Qty: 90 TAB | Refills: 3 | Status: SHIPPED | OUTPATIENT
Start: 2020-04-03 | End: 2020-07-06 | Stop reason: SDUPTHER

## 2020-07-06 ENCOUNTER — OFFICE VISIT (OUTPATIENT)
Dept: CARDIOLOGY CLINIC | Age: 65
End: 2020-07-06

## 2020-07-06 VITALS
WEIGHT: 130 LBS | OXYGEN SATURATION: 98 % | DIASTOLIC BLOOD PRESSURE: 73 MMHG | TEMPERATURE: 96.6 F | BODY MASS INDEX: 21.66 KG/M2 | HEART RATE: 66 BPM | HEIGHT: 65 IN | SYSTOLIC BLOOD PRESSURE: 132 MMHG

## 2020-07-06 DIAGNOSIS — I34.0 NONRHEUMATIC MITRAL VALVE REGURGITATION: ICD-10-CM

## 2020-07-06 DIAGNOSIS — I48.20 CHRONIC ATRIAL FIBRILLATION (HCC): Primary | ICD-10-CM

## 2020-07-06 DIAGNOSIS — I50.42 CHRONIC COMBINED SYSTOLIC AND DIASTOLIC CONGESTIVE HEART FAILURE (HCC): ICD-10-CM

## 2020-07-06 RX ORDER — DIGOXIN 125 MCG
0.12 TABLET ORAL DAILY
Qty: 90 TAB | Refills: 3 | Status: SHIPPED | OUTPATIENT
Start: 2020-07-06 | End: 2020-10-29

## 2020-07-06 RX ORDER — CARVEDILOL 12.5 MG/1
12.5 TABLET ORAL 2 TIMES DAILY WITH MEALS
Qty: 180 TAB | Refills: 3 | Status: SHIPPED | OUTPATIENT
Start: 2020-07-06 | End: 2020-11-10

## 2020-07-06 RX ORDER — FUROSEMIDE 20 MG/1
20 TABLET ORAL
Qty: 90 TAB | Refills: 3 | Status: SHIPPED | OUTPATIENT
Start: 2020-07-06 | End: 2020-10-29 | Stop reason: SDUPTHER

## 2020-07-06 NOTE — PROGRESS NOTES
HISTORY OF PRESENT ILLNESS  Braulio Pacheco is a 71 y.o. female. Palpitations    The history is provided by the patient. This is a new problem. The current episode started more than 1 week ago (7/19). The problem has been gradually improving. The problem occurs daily. On average, each episode lasts 2 minutes. The problem is associated with nothing (ADLs). Associated symptoms include malaise/fatigue, nausea (rare), dizziness (occasional) and shortness of breath. Pertinent negatives include no diaphoresis, no fever, no chest pain, no claudication, no orthopnea, no PND, no vomiting, no headaches and no cough. Shortness of Breath   The history is provided by the patient. This is a new problem. The problem occurs intermittently. The current episode started more than 1 week ago. The problem has not changed since onset. Pertinent negatives include no fever, no headaches, no cough, no wheezing, no PND, no orthopnea, no chest pain, no vomiting, no rash, no leg swelling and no claudication. The problem's precipitants include exercise (Walking within the house; 1/20 steps). CHF   The history is provided by the medical records. This is a chronic problem. Associated symptoms include shortness of breath. Pertinent negatives include no chest pain and no headaches. Review of Systems   Constitutional: Positive for malaise/fatigue. Negative for chills, diaphoresis, fever and weight loss. HENT: Negative for nosebleeds. Eyes: Negative for discharge. Respiratory: Positive for shortness of breath. Negative for cough and wheezing. Cardiovascular: Positive for palpitations. Negative for chest pain, orthopnea, claudication, leg swelling and PND. Gastrointestinal: Positive for nausea (rare). Negative for diarrhea and vomiting. Genitourinary: Negative for dysuria and hematuria. Musculoskeletal: Negative for joint pain. Skin: Negative for rash. Neurological: Positive for dizziness (occasional).  Negative for seizures, loss of consciousness and headaches. Endo/Heme/Allergies: Negative for polydipsia. Does not bruise/bleed easily. Psychiatric/Behavioral: Negative for depression and substance abuse. The patient does not have insomnia. No Known Allergies    History reviewed. No pertinent past medical history. Family History   Problem Relation Age of Onset    Stroke Brother 47    Heart Attack Neg Hx        Social History     Tobacco Use    Smoking status: Never Smoker    Smokeless tobacco: Never Used   Substance Use Topics    Alcohol use: Not Currently     Frequency: Never    Drug use: Not Currently        Current Outpatient Medications   Medication Sig    digoxin (LANOXIN) 0.125 mg tablet Take 1 Tab by mouth daily.  carvedilol (COREG) 12.5 mg tablet Take 1 Tab by mouth two (2) times daily (with meals).  furosemide (LASIX) 20 mg tablet Take 1 Tab by mouth daily as needed (Shortness of breath). Skip if SBP less than 105    levocetirizine (XYZAL) 5 mg tablet Take  by mouth.  cholecalciferol (VITAMIN D3) 1,000 unit cap Take  by mouth daily.  apixaban (ELIQUIS) 5 mg tablet Take 1 Tab by mouth two (2) times a day. No current facility-administered medications for this visit. Past Surgical History:   Procedure Laterality Date    HX LAP CHOLECYSTECTOMY         Visit Vitals  /73 (BP 1 Location: Left arm, BP Patient Position: Sitting)   Pulse 66   Temp (!) 96.6 °F (35.9 °C)   Ht 5' 5\" (1.651 m)   Wt 59 kg (130 lb)   SpO2 98%   BMI 21.63 kg/m²       Diagnostic Studies:  I have reviewed the relevant tests done on the patient and show as follows  EKG tracings reviewed by me today. EKG Results     None        XR Results (most recent):  No results found for this or any previous visit. 12/03/19   ECHO ADULT COMPLETE 12/03/2019 12/3/2019    Narrative · Left Ventricle: Normal cavity size and wall thickness. Mild systolic   dysfunction. Estimated left ventricular ejection fraction is 46 - 50%. Abnormal left ventricular wall motion. · Left Atrium: Severely dilated left atrium. · Right Ventricle: Moderately dilated right ventricle. Borderline low   systolic function. · Right Atrium: Moderately dilated right atrium. · Mitral Valve: Mitral valve thickening. Moderate mitral valve   regurgitation is present. · Tricuspid Valve: Mild tricuspid valve regurgitation is present. · Pulmonary Artery: There is no evidence of pulmonary hypertension. · Pulmonic Valve: Mild pulmonic valve regurgitation is present. · IVC/Hepatic Veins: Mildly elevated central venous pressure (5-10 mmHg);   IVC diameter is less than 21 mm and collapses less than 50% with   respiration. Signed by: Margaret Fox MD     12/03/19   NUCLEAR CARDIAC STRESS TEST 12/03/2019 12/4/2019    Narrative · Gated SPECT: Left ventricular function post-stress was normal.   Calculated ejection fraction is 57%. There is no evidence of transient   ischemic dilation (TID). The TID ratio is 0.8. · Baseline ECG: Atrial fibrillation, non-specific ST-T wave abnormalities. · Negative stress test.  · Left ventricular perfusion is normal.  · Negative myocardial perfusion imaging. Myocardial perfusion imaging   supports a low risk stress test.        Signed by: Sherie Rosado MD                 No flowsheet data found. Ms. Jarek Valente has a reminder for a \"due or due soon\" health maintenance. I have asked that she contact her primary care provider for follow-up on this health maintenance. Physical Exam   Constitutional: She is oriented to person, place, and time. She appears well-developed and well-nourished. No distress. HENT:   Head: Normocephalic and atraumatic. Mouth/Throat: Normal dentition. Eyes: Right eye exhibits no discharge. Left eye exhibits no discharge. No scleral icterus. Neck: Neck supple. No JVD present. Carotid bruit is not present. No thyromegaly present.    Cardiovascular: Normal rate, S1 normal, S2 normal, normal heart sounds and intact distal pulses. An irregularly irregular rhythm present. Exam reveals no gallop and no friction rub. No murmur heard. Pulmonary/Chest: Effort normal and breath sounds normal. She has no wheezes. She has no rales. Abdominal: Soft. She exhibits no mass. There is no abdominal tenderness. Musculoskeletal:         General: No edema. Lymphadenopathy:        Right cervical: No superficial cervical adenopathy present. Left cervical: No superficial cervical adenopathy present. Neurological: She is alert and oriented to person, place, and time. Skin: Skin is warm and dry. No rash noted. Psychiatric: She has a normal mood and affect. Her behavior is normal.       ASSESSMENT and PLAN    Atrial Fibrillation CHADSVASC2 Score Stroke Risk:   71 y.o. 71 to 76  +1   female Female +1   CHF HX: No    + 0   HTN HX: No    + 0   Stroke/TIA/Thromboembolism No    +0   Vascular Disease HX: No    + 0   Diabetes Mellitus No    + 0   CHADSVASC 2 Score 2      Annual Stroke Risk 2.2%- moderate-high        MR: 12/19 mild to moderate  Cardiomyopathy: 12/19 45 to 50% EF, dilated LA, RA, RV      Has chronic intermittent and possibly persistent atrial fibrillation for last 3 months or so clinically. Intermittent symptoms of shortness of breath nausea mild sweating. Found to have cardiomyopathy with mild to moderate MR and dilated left atrium, RA and RV. Possible myocarditis as she had a prolonged cough before this all started. CHF is responding to medications fairly well. 7/20 Would recommend to hospitalize and start sotalol and do the cardioversion if needed. Discussed with patient but she wants to talk to family and then will get back to us. In the meantime continue the same medications. Diagnoses and all orders for this visit:    1. Chronic atrial fibrillation (HCC)  -     digoxin (LANOXIN) 0.125 mg tablet; Take 1 Tab by mouth daily. -     carvediloL (COREG) 12.5 mg tablet;  Take 1 Tab by mouth two (2) times daily (with meals). -     AMB POC EKG ROUTINE W/ 12 LEADS, INTER & REP    2. Chronic combined systolic and diastolic congestive heart failure (HCC)  -     digoxin (LANOXIN) 0.125 mg tablet; Take 1 Tab by mouth daily. -     furosemide (LASIX) 20 mg tablet; Take 1 Tab by mouth daily as needed (Shortness of breath). Skip if SBP less than 105    3. Nonrheumatic mitral valve regurgitation        Pertinent laboratory and test data reviewed and discussed with patient. See patient instructions also for other medical advice given    Medications Discontinued During This Encounter   Medication Reason    digoxin (LANOXIN) 0.125 mg tablet Reorder    carvedilol (COREG) 12.5 mg tablet Reorder    furosemide (LASIX) 20 mg tablet Reorder       Follow-up and Dispositions    · Return in about 3 months (around 10/6/2020), or if symptoms worsen or fail to improve.

## 2020-07-06 NOTE — PROGRESS NOTES
1. Have you been to the ER, urgent care clinic since your last visit? Hospitalized since your last visit?     no    2. Have you seen or consulted any other health care providers outside of the [address] [place] [address] since your last visit? Include any pap smears or colon screening. yes  3. Since your last visit, have you had any of the following symptoms?      palpitations, shortness of breath and dizziness. Explain:sx comes and goes    4. Have you had any blood work, X-rays or cardiac testing?      no     Requested: NO     In [facility]: NO    5. Where do you normally have your labs drawn?   [facility]    6. Do you need any refills today?    no

## 2020-07-06 NOTE — PATIENT INSTRUCTIONS
Medications Discontinued During This Encounter Medication Reason  digoxin (LANOXIN) 0.125 mg tablet Reorder  carvedilol (COREG) 12.5 mg tablet Reorder  furosemide (LASIX) 20 mg tablet Reorder Learning About Cardioversion What is cardioversion? Cardioversion helps your heart return to a normal rhythm. It treats problems like atrial fibrillation. It is also sometimes used in emergencies. It can correct a fast heartbeat that causes low blood pressure, chest pain, or heart failure. There are two types: · The electrical type uses an electric current. The current enters your body through patches on your chest or back. · The chemical type uses medicines. The medicine is usually put into your arm through a tube called an IV. How is cardioversion done? Your doctor may ask you to take medicines before the treatment. These help prevent blood clots. Your doctor will watch you closely to make sure that there are no problems. Electrical cardioversion The electrical procedure is done in a hospital. You will get medicine to help you relax and control the pain. Your doctor will put patches on your chest or back. The patches send an electric current to your heart. This resets your heart rhythm. The electrical part takes about 5 minutes. But you will probably be in the hospital for 1 to 2 hours. You will need to recover from the effects of the sedative medicine. Chemical cardioversion The chemical procedure is most often done in a hospital. In most cases, the medicine is put into your arm through a tube called an IV. But you may get medicines to take by mouth. You may feel a quick sting or pinch when the IV starts. The procedure usually takes about 4 to 8 hours. What can you expect after cardioversion? · You can usually go home the same day. You will need someone to drive you home. · Your doctor may have you take medicines daily. These help your heart beat normally and prevent blood clots. · After electrical cardioversion, you may have redness where the patches were. This looks and feels like a sunburn. · Abnormal heart rhythms sometimes come back after cardioversion. Follow-up care is a key part of your treatment and safety. Be sure to make and go to all appointments, and call your doctor if you are having problems. It's also a good idea to know your test results and keep a list of the medicines you take. Where can you learn more? Go to http://www.gray.com/ Enter U860 in the search box to learn more about \"Learning About Cardioversion. \" Current as of: 2019               Content Version: 12.5 © 4648-1121 [employer]. Care instructions adapted under license by [employer] (which disclaims liability or warranty for this information). If you have questions about a medical condition or this instruction, always ask your healthcare professional. [address] any warranty or liability for your use of this information. Parudi Activation Thank you for requesting access to Parudi. Please follow the instructions below to securely access and download your online medical record. Parudi allows you to send messages to your doctor, view your test results, renew your prescriptions, schedule appointments, and more. How Do I Sign Up? 1. In your internet browser, go to [email or web address]. 2. Click on the First Time User? Click Here link in the Sign In box. You will see the New Member Sign Up page. 3. Enter your Parudi Access Code exactly as it appears below. You will not need to use this code after youve completed the sign-up process. If you do not sign up before the expiration date, you must request a new code. Parudi Access Code: [email or web address] Expires: 2020  9:35 AM (This is the date your Parudi access code will ) 4. Enter the last four digits of your Social Security Number (xxxx) and Date of Birth (mm/dd/yyyy) as indicated and click Submit. You will be taken to the next sign-up page. 5. Create a [email or web address] ID. This will be your [email or web address] login ID and cannot be changed, so think of one that is secure and easy to remember. 6. Create a [email or web address] password. You can change your password at any time. 7. Enter your Password Reset Question and Answer. This can be used at a later time if you forget your password. 8. Enter your e-mail address. You will receive e-mail notification when new information is available in [address] 9. Click Sign Up. You can now view and download portions of your medical record. 10. Click the Download Summary menu link to download a portable copy of your medical information. Additional Information If you have questions, please visit the Frequently Asked Questions section of the [email or web address] website at [email or web address] Remember, [email or web address] is NOT to be used for urgent needs. For medical emergencies, dial 911.

## 2020-10-29 ENCOUNTER — OFFICE VISIT (OUTPATIENT)
Dept: CARDIOLOGY CLINIC | Age: 65
End: 2020-10-29
Payer: MEDICARE

## 2020-10-29 VITALS
TEMPERATURE: 97 F | WEIGHT: 130 LBS | HEART RATE: 74 BPM | SYSTOLIC BLOOD PRESSURE: 140 MMHG | BODY MASS INDEX: 21.66 KG/M2 | HEIGHT: 65 IN | DIASTOLIC BLOOD PRESSURE: 81 MMHG

## 2020-10-29 DIAGNOSIS — I34.0 NONRHEUMATIC MITRAL VALVE REGURGITATION: ICD-10-CM

## 2020-10-29 DIAGNOSIS — I50.42 CHRONIC COMBINED SYSTOLIC AND DIASTOLIC CONGESTIVE HEART FAILURE (HCC): ICD-10-CM

## 2020-10-29 DIAGNOSIS — I48.20 CHRONIC ATRIAL FIBRILLATION (HCC): Primary | ICD-10-CM

## 2020-10-29 PROCEDURE — G8400 PT W/DXA NO RESULTS DOC: HCPCS | Performed by: INTERNAL MEDICINE

## 2020-10-29 PROCEDURE — G8536 NO DOC ELDER MAL SCRN: HCPCS | Performed by: INTERNAL MEDICINE

## 2020-10-29 PROCEDURE — 1101F PT FALLS ASSESS-DOCD LE1/YR: CPT | Performed by: INTERNAL MEDICINE

## 2020-10-29 PROCEDURE — 3017F COLORECTAL CA SCREEN DOC REV: CPT | Performed by: INTERNAL MEDICINE

## 2020-10-29 PROCEDURE — G8427 DOCREV CUR MEDS BY ELIG CLIN: HCPCS | Performed by: INTERNAL MEDICINE

## 2020-10-29 PROCEDURE — 99214 OFFICE O/P EST MOD 30 MIN: CPT | Performed by: INTERNAL MEDICINE

## 2020-10-29 PROCEDURE — G8420 CALC BMI NORM PARAMETERS: HCPCS | Performed by: INTERNAL MEDICINE

## 2020-10-29 PROCEDURE — G8510 SCR DEP NEG, NO PLAN REQD: HCPCS | Performed by: INTERNAL MEDICINE

## 2020-10-29 PROCEDURE — 1090F PRES/ABSN URINE INCON ASSESS: CPT | Performed by: INTERNAL MEDICINE

## 2020-10-29 RX ORDER — FUROSEMIDE 20 MG/1
20 TABLET ORAL
Qty: 90 TAB | Refills: 3 | Status: SHIPPED | OUTPATIENT
Start: 2020-10-29 | End: 2021-11-29 | Stop reason: SDUPTHER

## 2020-10-29 RX ORDER — DIGOXIN 125 MCG
0.12 TABLET ORAL EVERY OTHER DAY
Qty: 45 TAB | Refills: 1 | Status: SHIPPED | OUTPATIENT
Start: 2020-10-29 | End: 2020-11-10

## 2020-10-29 RX ORDER — LISINOPRIL 5 MG/1
5 TABLET ORAL DAILY
Qty: 30 TAB | Refills: 1 | Status: SHIPPED | OUTPATIENT
Start: 2020-10-29 | End: 2020-11-23 | Stop reason: SINTOL

## 2020-10-29 NOTE — PROGRESS NOTES
HISTORY OF PRESENT ILLNESS  Sirisha Meadows is a 76 y.o. female. CHF   The history is provided by the medical records. This is a chronic problem. Associated symptoms include shortness of breath. Pertinent negatives include no chest pain and no headaches. Palpitations    The history is provided by the patient. This is a new problem. The current episode started more than 1 week ago (7/19). The problem has been gradually improving. The problem occurs every several days. On average, each episode lasts 2 minutes. The problem is associated with nothing (ADLs). Associated symptoms include malaise/fatigue, nausea (rare), dizziness (occasional) and shortness of breath. Pertinent negatives include no diaphoresis, no fever, no chest pain, no claudication, no orthopnea, no PND, no vomiting, no headaches and no cough. Shortness of Breath   The history is provided by the patient. This is a new problem. The problem occurs intermittently. The current episode started more than 1 week ago. The problem has not changed since onset. Pertinent negatives include no fever, no headaches, no cough, no wheezing, no PND, no orthopnea, no chest pain, no vomiting, no rash, no leg swelling and no claudication. The problem's precipitants include exercise (Walking within the house; 1/20 steps). Review of Systems   Constitutional: Positive for malaise/fatigue. Negative for chills, diaphoresis, fever and weight loss. HENT: Negative for nosebleeds. Eyes: Negative for discharge. Respiratory: Positive for shortness of breath. Negative for cough and wheezing. Cardiovascular: Positive for palpitations. Negative for chest pain, orthopnea, claudication, leg swelling and PND. Gastrointestinal: Positive for nausea (rare). Negative for diarrhea and vomiting. Genitourinary: Negative for dysuria and hematuria. Musculoskeletal: Negative for joint pain. Skin: Negative for rash. Neurological: Positive for dizziness (occasional).  Negative for seizures, loss of consciousness and headaches. Endo/Heme/Allergies: Negative for polydipsia. Does not bruise/bleed easily. Psychiatric/Behavioral: Negative for depression and substance abuse. The patient does not have insomnia. No Known Allergies    History reviewed. No pertinent past medical history. Family History   Problem Relation Age of Onset    Stroke Brother 47    Heart Attack Neg Hx        Social History     Tobacco Use    Smoking status: Never Smoker    Smokeless tobacco: Never Used   Substance Use Topics    Alcohol use: Not Currently     Frequency: Never    Drug use: Not Currently        Current Outpatient Medications   Medication Sig    digoxin (LANOXIN) 0.125 mg tablet Take 1 Tab by mouth daily.  carvediloL (COREG) 12.5 mg tablet Take 1 Tab by mouth two (2) times daily (with meals).  furosemide (LASIX) 20 mg tablet Take 1 Tab by mouth daily as needed (Shortness of breath). Skip if SBP less than 105    levocetirizine (XYZAL) 5 mg tablet Take  by mouth.  apixaban (ELIQUIS) 5 mg tablet Take 1 Tab by mouth two (2) times a day. No current facility-administered medications for this visit. Past Surgical History:   Procedure Laterality Date    HX LAP CHOLECYSTECTOMY         Visit Vitals  BP (!) 140/81   Pulse 74   Temp 97 °F (36.1 °C)   Ht 5' 5\" (1.651 m)   Wt 59 kg (130 lb)   BMI 21.63 kg/m²       Diagnostic Studies:  I have reviewed the relevant tests done on the patient and show as follows  EKG tracings reviewed by me today. EKG Results     None        XR Results (most recent):  No results found for this or any previous visit. 12/03/19   ECHO ADULT COMPLETE 12/03/2019 12/3/2019    Narrative · Left Ventricle: Normal cavity size and wall thickness. Mild systolic   dysfunction. Estimated left ventricular ejection fraction is 46 - 50%. Abnormal left ventricular wall motion. · Left Atrium: Severely dilated left atrium. · Right Ventricle:  Moderately dilated right ventricle. Borderline low   systolic function. · Right Atrium: Moderately dilated right atrium. · Mitral Valve: Mitral valve thickening. Moderate mitral valve   regurgitation is present. · Tricuspid Valve: Mild tricuspid valve regurgitation is present. · Pulmonary Artery: There is no evidence of pulmonary hypertension. · Pulmonic Valve: Mild pulmonic valve regurgitation is present. · IVC/Hepatic Veins: Mildly elevated central venous pressure (5-10 mmHg);   IVC diameter is less than 21 mm and collapses less than 50% with   respiration. Signed by: Melinda Watts MD     12/03/19   NUCLEAR CARDIAC STRESS TEST 12/03/2019 12/4/2019    Narrative · Gated SPECT: Left ventricular function post-stress was normal.   Calculated ejection fraction is 57%. There is no evidence of transient   ischemic dilation (TID). The TID ratio is 0.8. · Baseline ECG: Atrial fibrillation, non-specific ST-T wave abnormalities. · Negative stress test.  · Left ventricular perfusion is normal.  · Negative myocardial perfusion imaging. Myocardial perfusion imaging   supports a low risk stress test.        Signed by: Vanessa Scott MD                     Ms. Jacque Christianson has a reminder for a \"due or due soon\" health maintenance. I have asked that she contact her primary care provider for follow-up on this health maintenance. Physical Exam   Constitutional: She is oriented to person, place, and time. She appears well-developed and well-nourished. No distress. HENT:   Head: Normocephalic and atraumatic. Mouth/Throat: Normal dentition. Eyes: Right eye exhibits no discharge. Left eye exhibits no discharge. No scleral icterus. Neck: Neck supple. No JVD present. Carotid bruit is not present. No thyromegaly present. Cardiovascular: Normal rate, S1 normal, S2 normal, normal heart sounds and intact distal pulses. An irregularly irregular rhythm present. Exam reveals no gallop and no friction rub.    No murmur heard.  Pulmonary/Chest: Effort normal and breath sounds normal. She has no wheezes. She has no rales. Abdominal: Soft. She exhibits no mass. There is no abdominal tenderness. Musculoskeletal:         General: No edema. Lymphadenopathy:        Right cervical: No superficial cervical adenopathy present. Left cervical: No superficial cervical adenopathy present. Neurological: She is alert and oriented to person, place, and time. Skin: Skin is warm and dry. No rash noted. Psychiatric: She has a normal mood and affect. Her behavior is normal.       ASSESSMENT and PLAN    Atrial Fibrillation CHADSVASC2 Score Stroke Risk:   76 y.o. 72 to 76  +1   female Female +1   CHF HX: No    + 0   HTN HX: No    + 0   Stroke/TIA/Thromboembolism No    +0   Vascular Disease HX: No    + 0   Diabetes Mellitus No    + 0   CHADSVASC 2 Score 2      Annual Stroke Risk 2.2%- moderate-high        MR: 12/19 mild to moderate  Cardiomyopathy: 12/19 45 to 50% EF, dilated LA, RA, RV       Has chronic intermittent and possibly persistent atrial fibrillation for last 3 months or so clinically. Intermittent symptoms of shortness of breath nausea mild sweating. Found to have cardiomyopathy with mild to moderate MR and dilated left atrium, RA and RV. Possible myocarditis as she had a prolonged cough before this all started. CHF is responding to medications fairly well. 7/20 Would recommend to hospitalize and start sotalol and do the cardioversion if needed. Discussed with patient but she wants to talk to family and then will get back to us. In the meantime continue the same medications. 10/20 patient never called back but after discussing in the office today, she wants to proceed with sotalol and possible cardioversion. Add lisinopril for cardiomyopathy. Will arrange a elective admission and possible cardioversion. Discussed with patient the procedure and explained the risks and complications.   Discussed with patient cardioversion procedure. Risks of IV sedation and DC shock explained which included, but not limited to, respiratory depression from IV sedation, failure of procedure and possibility of recurrence despite initial success. All questions were answered. Patient is willing to proceed. Diagnoses and all orders for this visit:    1. Chronic atrial fibrillation (HCC)  -     digoxin (LANOXIN) 0.125 mg tablet; Take 1 Tab by mouth every other day. 2. Chronic combined systolic and diastolic congestive heart failure (HCC)  -     furosemide (LASIX) 20 mg tablet; Take 1 Tab by mouth daily as needed (Shortness of breath). Skip if SBP less than 105  -     lisinopriL (PRINIVIL, ZESTRIL) 5 mg tablet; Take 1 Tab by mouth daily.  -     METABOLIC PANEL, BASIC; Future  -     digoxin (LANOXIN) 0.125 mg tablet; Take 1 Tab by mouth every other day. 3. Nonrheumatic mitral valve regurgitation        Pertinent laboratory and test data reviewed and discussed with patient. See patient instructions also for other medical advice given    Medications Discontinued During This Encounter   Medication Reason    cholecalciferol (VITAMIN D3) 1,000 unit cap     furosemide (LASIX) 20 mg tablet Reorder    digoxin (LANOXIN) 0.125 mg tablet        Follow-up and Dispositions    · Return in about 1 month (around 11/29/2020), or if symptoms worsen or fail to improve, for post hosp f/u.

## 2020-10-29 NOTE — PROGRESS NOTES
1. Have you been to the ER, urgent care clinic since your last visit? Hospitalized since your last visit?    no    2. Have you seen or consulted any other health care providers outside of the [address] [place] [address] since your last visit? Include any pap smears or colon screening. Yes pcp    3. Since your last visit, have you had any of the following symptoms? No cardiac symptoms        4. Have you had any blood work, X-rays or cardiac testing?       no         5. Where do you normally have your labs drawn? Any [facility] facility     6. Do you need any refills today?    yes

## 2020-10-29 NOTE — H&P (VIEW-ONLY)
HISTORY OF PRESENT ILLNESS Mike Reddy is a 76 y.o. female. CHF The history is provided by the medical records. This is a chronic problem. Associated symptoms include shortness of breath. Pertinent negatives include no chest pain and no headaches. Palpitations The history is provided by the patient. This is a new problem. The current episode started more than 1 week ago (7/19). The problem has been gradually improving. The problem occurs every several days. On average, each episode lasts 2 minutes. The problem is associated with nothing (ADLs). Associated symptoms include malaise/fatigue, nausea (rare), dizziness (occasional) and shortness of breath. Pertinent negatives include no diaphoresis, no fever, no chest pain, no claudication, no orthopnea, no PND, no vomiting, no headaches and no cough. Shortness of Breath The history is provided by the patient. This is a new problem. The problem occurs intermittently. The current episode started more than 1 week ago. The problem has not changed since onset. Pertinent negatives include no fever, no headaches, no cough, no wheezing, no PND, no orthopnea, no chest pain, no vomiting, no rash, no leg swelling and no claudication. The problem's precipitants include exercise (Walking within the house; 1/20 steps). Review of Systems Constitutional: Positive for malaise/fatigue. Negative for chills, diaphoresis, fever and weight loss. HENT: Negative for nosebleeds. Eyes: Negative for discharge. Respiratory: Positive for shortness of breath. Negative for cough and wheezing. Cardiovascular: Positive for palpitations. Negative for chest pain, orthopnea, claudication, leg swelling and PND. Gastrointestinal: Positive for nausea (rare). Negative for diarrhea and vomiting. Genitourinary: Negative for dysuria and hematuria. Musculoskeletal: Negative for joint pain. Skin: Negative for rash. Neurological: Positive for dizziness (occasional). Negative for seizures, loss of consciousness and headaches. Endo/Heme/Allergies: Negative for polydipsia. Does not bruise/bleed easily. Psychiatric/Behavioral: Negative for depression and substance abuse. The patient does not have insomnia. No Known Allergies History reviewed. No pertinent past medical history. Family History Problem Relation Age of Onset  Stroke Brother 47  
 Heart Attack Neg Hx Social History Tobacco Use  Smoking status: Never Smoker  Smokeless tobacco: Never Used Substance Use Topics  Alcohol use: Not Currently Frequency: Never  Drug use: Not Currently Current Outpatient Medications Medication Sig  digoxin (LANOXIN) 0.125 mg tablet Take 1 Tab by mouth daily.  carvediloL (COREG) 12.5 mg tablet Take 1 Tab by mouth two (2) times daily (with meals).  furosemide (LASIX) 20 mg tablet Take 1 Tab by mouth daily as needed (Shortness of breath). Skip if SBP less than 105  levocetirizine (XYZAL) 5 mg tablet Take  by mouth.  apixaban (ELIQUIS) 5 mg tablet Take 1 Tab by mouth two (2) times a day. No current facility-administered medications for this visit. Past Surgical History:  
Procedure Laterality Date  HX LAP CHOLECYSTECTOMY Visit Vitals BP (!) 140/81 Pulse 74 Temp 97 °F (36.1 °C) Ht 5' 5\" (1.651 m) Wt 59 kg (130 lb) BMI 21.63 kg/m² Diagnostic Studies: 
I have reviewed the relevant tests done on the patient and show as follows EKG tracings reviewed by me today. EKG Results None XR Results (most recent): No results found for this or any previous visit. 12/03/19 ECHO ADULT COMPLETE 12/03/2019 12/3/2019 Narrative · Left Ventricle: Normal cavity size and wall thickness. Mild systolic  
dysfunction. Estimated left ventricular ejection fraction is 46 - 50%. Abnormal left ventricular wall motion. · Left Atrium: Severely dilated left atrium. · Right Ventricle: Moderately dilated right ventricle. Borderline low  
systolic function. · Right Atrium: Moderately dilated right atrium. · Mitral Valve: Mitral valve thickening. Moderate mitral valve  
regurgitation is present. · Tricuspid Valve: Mild tricuspid valve regurgitation is present. · Pulmonary Artery: There is no evidence of pulmonary hypertension. · Pulmonic Valve: Mild pulmonic valve regurgitation is present. · IVC/Hepatic Veins: Mildly elevated central venous pressure (5-10 mmHg); IVC diameter is less than 21 mm and collapses less than 50% with  
respiration. Signed by: Krish Leonard MD  
 
12/03/19 NUCLEAR CARDIAC STRESS TEST 12/03/2019 12/4/2019 Narrative · Gated SPECT: Left ventricular function post-stress was normal.  
Calculated ejection fraction is 57%. There is no evidence of transient  
ischemic dilation (TID). The TID ratio is 0.8. · Baseline ECG: Atrial fibrillation, non-specific ST-T wave abnormalities. · Negative stress test. 
· Left ventricular perfusion is normal. 
· Negative myocardial perfusion imaging. Myocardial perfusion imaging  
supports a low risk stress test. 
   
  Signed by: Christopher Fox MD  
 
 
  
  
 
 
 
 
Ms. Arnulfo Lazo has a reminder for a \"due or due soon\" health maintenance. I have asked that she contact her primary care provider for follow-up on this health maintenance. Physical Exam  
Constitutional: She is oriented to person, place, and time. She appears well-developed and well-nourished. No distress. HENT:  
Head: Normocephalic and atraumatic. Mouth/Throat: Normal dentition. Eyes: Right eye exhibits no discharge. Left eye exhibits no discharge. No scleral icterus. Neck: Neck supple. No JVD present. Carotid bruit is not present. No thyromegaly present.   
Cardiovascular: Normal rate, S1 normal, S2 normal, normal heart sounds and intact distal pulses. An irregularly irregular rhythm present. Exam reveals no gallop and no friction rub. No murmur heard. Pulmonary/Chest: Effort normal and breath sounds normal. She has no wheezes. She has no rales. Abdominal: Soft. She exhibits no mass. There is no abdominal tenderness. Musculoskeletal:     
   General: No edema. Lymphadenopathy:  
     Right cervical: No superficial cervical adenopathy present. Left cervical: No superficial cervical adenopathy present. Neurological: She is alert and oriented to person, place, and time. Skin: Skin is warm and dry. No rash noted. Psychiatric: She has a normal mood and affect. Her behavior is normal.  
 
 
ASSESSMENT and PLAN Atrial Fibrillation CHADSVASC2 Score Stroke Risk:  
76 y.o. 72 to 76  +1  
female Female +1  
CHF HX: No    + 0 HTN HX: No    + 0 Stroke/TIA/Thromboembolism No    +0 Vascular Disease HX: No    + 0 Diabetes Mellitus No    + 0 CHADSVASC 2 Score 2      Annual Stroke Risk 2.2%- moderate-high MR: 12/19 mild to moderate Cardiomyopathy: 12/19 45 to 50% EF, dilated LA, RA, RV Has chronic intermittent and possibly persistent atrial fibrillation for last 3 months or so clinically. Intermittent symptoms of shortness of breath nausea mild sweating. Found to have cardiomyopathy with mild to moderate MR and dilated left atrium, RA and RV. Possible myocarditis as she had a prolonged cough before this all started. CHF is responding to medications fairly well. 7/20 Would recommend to hospitalize and start sotalol and do the cardioversion if needed. Discussed with patient but she wants to talk to family and then will get back to us. In the meantime continue the same medications. 10/20 patient never called back but after discussing in the office today, she wants to proceed with sotalol and possible cardioversion. Add lisinopril for cardiomyopathy.   Will arrange a elective admission and possible cardioversion. Discussed with patient the procedure and explained the risks and complications. Discussed with patient cardioversion procedure. Risks of IV sedation and DC shock explained which included, but not limited to, respiratory depression from IV sedation, failure of procedure and possibility of recurrence despite initial success. All questions were answered. Patient is willing to proceed. Diagnoses and all orders for this visit: 
 
1. Chronic atrial fibrillation (HCC) 
-     digoxin (LANOXIN) 0.125 mg tablet; Take 1 Tab by mouth every other day. 2. Chronic combined systolic and diastolic congestive heart failure ([address] -     furosemide (LASIX) 20 mg tablet; Take 1 Tab by mouth daily as needed (Shortness of breath). Skip if SBP less than 105 
-     lisinopriL (PRINIVIL, ZESTRIL) 5 mg tablet; Take 1 Tab by mouth daily. 
-     METABOLIC PANEL, BASIC; Future 
-     digoxin (LANOXIN) 0.125 mg tablet; Take 1 Tab by mouth every other day. 3. Nonrheumatic mitral valve regurgitation Pertinent laboratory and test data reviewed and discussed with patient. See patient instructions also for other medical advice given Medications Discontinued During This Encounter Medication Reason  cholecalciferol (VITAMIN D3) 1,000 unit cap  furosemide (LASIX) 20 mg tablet Reorder  digoxin (LANOXIN) 0.125 mg tablet Follow-up and Dispositions · Return in about 1 month (around 11/29/2020), or if symptoms worsen or fail to improve, for post hosp f/u.

## 2020-10-29 NOTE — PATIENT INSTRUCTIONS
Medications Discontinued During This Encounter Medication Reason  cholecalciferol (VITAMIN D3) 1,000 unit cap  furosemide (LASIX) 20 mg tablet Reorder  digoxin (LANOXIN) 0.125 mg tablet Learning About Cardioversion What is cardioversion? Cardioversion is a treatment that helps your heart return to a normal rhythm. It treats problems like atrial fibrillation. It is also sometimes used in emergencies. It can correct a fast heartbeat that causes low blood pressure, chest pain, or heart failure. Cardioversion can be done by using an electric current or medicines. What are the types of cardioversion? There are two types: · The electrical type uses an electric current. The current enters your body through patches on your chest or back. · The chemical type uses medicines. The medicine is usually put into your arm through a tube called an IV. Your doctor may ask you to take medicines before the treatment. These help prevent blood clots. Electrical cardioversion The electrical procedure is done in a hospital. You will get medicine to help you relax and control the pain. Your doctor will put patches on your chest or back. The patches send an electric current to your heart. This resets your heart rhythm. The electrical part takes about 5 minutes. But you will probably be in the hospital for 1 to 2 hours. You will need to recover from the effects of the sedative medicine. Chemical cardioversion The chemical procedure is most often done in a hospital. In most cases, the medicine is put into your arm through a tube called an IV. But you may get medicines to take by mouth. You may feel a quick sting or pinch when the IV starts. The procedure usually takes about 4 to 8 hours. What can you expect after cardioversion? · You can usually go home the same day. You will need someone to drive you home. · Your doctor may have you take medicines daily.  These help your heart beat normally and prevent blood clots. · After electrical cardioversion, you may have redness where the patches were. This looks and feels like a sunburn. · Abnormal heart rhythms sometimes come back after cardioversion. Follow-up care is a key part of your treatment and safety. Be sure to make and go to all appointments, and call your doctor if you are having problems. It's also a good idea to know your test results and keep a list of the medicines you take. Where can you learn more? Go to http://www.gray.com/ Enter I048 in the search box to learn more about \"Learning About Cardioversion. \" Current as of: December 16, 2019               Content Version: 12.6 © 0597-4112 [employer]. Care instructions adapted under license by [employer] (which disclaims liability or warranty for this information). If you have questions about a medical condition or this instruction, always ask your healthcare professional. [address] any warranty or liability for your use of this information.

## 2020-11-06 ENCOUNTER — TELEPHONE (OUTPATIENT)
Dept: CARDIOLOGY CLINIC | Age: 65
End: 2020-11-06

## 2020-11-06 NOTE — TELEPHONE ENCOUNTER
Patient called and states would like to discuss cardioversion with you.  Please call her at [phone]

## 2020-11-08 ENCOUNTER — HOSPITAL ENCOUNTER (OUTPATIENT)
Age: 65
Setting detail: OBSERVATION
LOS: 1 days | Discharge: HOME | End: 2020-11-10
Attending: INTERNAL MEDICINE | Admitting: INTERNAL MEDICINE
Payer: MEDICARE

## 2020-11-08 DIAGNOSIS — I48.20 CHRONIC A-FIB (HCC): ICD-10-CM

## 2020-11-08 DIAGNOSIS — I48.19 PERSISTENT ATRIAL FIBRILLATION (HCC): ICD-10-CM

## 2020-11-08 PROBLEM — I48.0 PAROXYSMAL ATRIAL FIBRILLATION (HCC): Status: ACTIVE | Noted: 2020-11-08

## 2020-11-08 PROCEDURE — 2709999900 HC NON-CHARGEABLE SUPPLY

## 2020-11-08 PROCEDURE — 99218 HC RM OBSERVATION: CPT

## 2020-11-08 PROCEDURE — 74011250637 HC RX REV CODE- 250/637: Performed by: INTERNAL MEDICINE

## 2020-11-08 RX ORDER — SOTALOL HYDROCHLORIDE 80 MG/1
80 TABLET ORAL ONCE
Status: COMPLETED | OUTPATIENT
Start: 2020-11-08 | End: 2020-11-08

## 2020-11-08 RX ORDER — LORATADINE 10 MG/1
10 TABLET ORAL DAILY
COMMUNITY
End: 2020-11-10

## 2020-11-08 RX ORDER — GUAIFENESIN 100 MG/5ML
81 LIQUID (ML) ORAL DAILY
COMMUNITY
End: 2020-11-23 | Stop reason: ALTCHOICE

## 2020-11-08 RX ADMIN — SOTALOL HYDROCHLORIDE 80 MG: 80 TABLET ORAL at 18:24

## 2020-11-08 RX ADMIN — SOTALOL HYDROCHLORIDE 80 MG: 80 TABLET ORAL at 23:47

## 2020-11-08 NOTE — PROGRESS NOTES
Patient arrived to unit at 1545 via security. Direct admit of 's. Brought up in wheelchair to unit. Patient A/O x 4, up ad melanie, on RA, VSS, on tele in A fibb. Mustapha Cools made aware of patient's arrival. Waiting on orders to be placed. Will continue to monitor. Attempted IV start x 2 without success. Oncoming RN Christopher Sequeira) made aware of need for IV access. Oncoming RN will attempt. Handoff report and bedside rounding given to Rob (oncoming RN) to include SBAR, Kardex, MAR, Summary of Care.

## 2020-11-08 NOTE — INTERVAL H&P NOTE
Update History & Physical    The Patient's History and Physical of November 8, 2020,   No new changes in plan. Start sotalol, watch QTc  Cardiovert 11/10 if needed.   Electronically signed by Alex Pena MD on 11/8/2020 at 6:33 PM

## 2020-11-09 LAB
ANION GAP SERPL CALC-SCNC: 5 MMOL/L (ref 3–18)
ATRIAL RATE: 46 BPM
BUN SERPL-MCNC: 17 MG/DL (ref 7–18)
BUN/CREAT SERPL: 25 (ref 12–20)
CALCIUM SERPL-MCNC: 8.7 MG/DL (ref 8.5–10.1)
CALCULATED R AXIS, ECG10: 27 DEGREES
CALCULATED T AXIS, ECG11: 43 DEGREES
CHLORIDE SERPL-SCNC: 107 MMOL/L (ref 100–111)
CO2 SERPL-SCNC: 30 MMOL/L (ref 21–32)
CREAT SERPL-MCNC: 0.69 MG/DL (ref 0.6–1.3)
DIAGNOSIS, 93000: NORMAL
ERYTHROCYTE [DISTWIDTH] IN BLOOD BY AUTOMATED COUNT: 14.1 % (ref 11.6–14.5)
GLUCOSE SERPL-MCNC: 96 MG/DL (ref 74–99)
HCT VFR BLD AUTO: 33.9 % (ref 35–45)
HGB BLD-MCNC: 11.6 G/DL (ref 12–16)
MCH RBC QN AUTO: 36.8 PG (ref 24–34)
MCHC RBC AUTO-ENTMCNC: 34.2 G/DL (ref 31–37)
MCV RBC AUTO: 107.6 FL (ref 74–97)
PLATELET # BLD AUTO: 245 K/UL (ref 135–420)
PMV BLD AUTO: 9.9 FL (ref 9.2–11.8)
POTASSIUM SERPL-SCNC: 4.3 MMOL/L (ref 3.5–5.5)
Q-T INTERVAL, ECG07: 468 MS
QRS DURATION, ECG06: 90 MS
QTC CALCULATION (BEZET), ECG08: 418 MS
RBC # BLD AUTO: 3.15 M/UL (ref 4.2–5.3)
SODIUM SERPL-SCNC: 142 MMOL/L (ref 136–145)
VENTRICULAR RATE, ECG03: 48 BPM
WBC # BLD AUTO: 3.5 K/UL (ref 4.6–13.2)

## 2020-11-09 PROCEDURE — 99218 HC RM OBSERVATION: CPT

## 2020-11-09 PROCEDURE — 93005 ELECTROCARDIOGRAM TRACING: CPT

## 2020-11-09 PROCEDURE — 99225 PR SBSQ OBSERVATION CARE/DAY 25 MINUTES: CPT | Performed by: INTERNAL MEDICINE

## 2020-11-09 PROCEDURE — 85027 COMPLETE CBC AUTOMATED: CPT

## 2020-11-09 PROCEDURE — 80048 BASIC METABOLIC PNL TOTAL CA: CPT

## 2020-11-09 PROCEDURE — 74011250637 HC RX REV CODE- 250/637: Performed by: INTERNAL MEDICINE

## 2020-11-09 PROCEDURE — 36415 COLL VENOUS BLD VENIPUNCTURE: CPT

## 2020-11-09 RX ORDER — SOTALOL HYDROCHLORIDE 80 MG/1
80 TABLET ORAL 2 TIMES DAILY
Status: DISCONTINUED | OUTPATIENT
Start: 2020-11-09 | End: 2020-11-10 | Stop reason: HOSPADM

## 2020-11-09 RX ORDER — LISINOPRIL 5 MG/1
5 TABLET ORAL DAILY
Status: DISCONTINUED | OUTPATIENT
Start: 2020-11-09 | End: 2020-11-10 | Stop reason: HOSPADM

## 2020-11-09 RX ORDER — FUROSEMIDE 20 MG/1
20 TABLET ORAL
Status: DISCONTINUED | OUTPATIENT
Start: 2020-11-09 | End: 2020-11-10 | Stop reason: HOSPADM

## 2020-11-09 RX ADMIN — SOTALOL HYDROCHLORIDE 80 MG: 80 TABLET ORAL at 17:02

## 2020-11-09 RX ADMIN — APIXABAN 5 MG: 5 TABLET, FILM COATED ORAL at 17:02

## 2020-11-09 RX ADMIN — APIXABAN 5 MG: 5 TABLET, FILM COATED ORAL at 08:45

## 2020-11-09 RX ADMIN — SOTALOL HYDROCHLORIDE 80 MG: 80 TABLET ORAL at 08:45

## 2020-11-09 RX ADMIN — LISINOPRIL 5 MG: 5 TABLET ORAL at 08:45

## 2020-11-09 NOTE — PROGRESS NOTES
Reason for Admission:  Paroxysmal atrial fibrillation ([facility] [address] [I48.0]                 RUR Score:    7%            Plan for utilizing home health:    Tbd, currently no needs. Likelihood of Readmission:   LOW                         Transition of Care Plan:              Initial assessment completed with patient. Cognitive status of patient: oriented to time, place, person and situation. Face sheet information confirmed:  yes. The patient designates daughter, Della Gutierres to participate in her discharge plan and to receive any needed information. This patient lives in a single family home. Patient is able to navigate steps as needed. Prior to hospitalization, patient was considered to be independent with ADLs/IADLS : yes . Patient has a current ACP document on file: no  The daughter will be available to transport patient home upon discharge. The patient already has no medical equipment available in the home. Patient is not currently active with home health. Patient has not stayed in a skilled nursing facility or rehab. This patient is on dialysis :no     Currently, the discharge plan is Home. The patient states that she can obtain her medications from the pharmacy, and take her medications as directed. Patient's current insurance is [facility]       Care Management Interventions  PCP Verified by CM:  Yes  Palliative Care Criteria Met (RRAT>21 & CHF Dx)?: No  Mode of Transport at Discharge: Self  Transition of Care Consult (CM Consult): Discharge Planning  Current Support Network: Family Lives Nearby  Confirm Follow Up Transport: Family  Discharge Location  Discharge Placement: [address] [place] [address]  Case Management  [phone]

## 2020-11-09 NOTE — PROGRESS NOTES
Cardiopulmonary Navigator Recommendations    Discussed with patient benefits of Cardiac Rehabilitation after discharge. Educational Material provided. Reinforced importance of good nutrition, low sodium diet and monitoring fluid intake, strict medication compliance, daily weights and blood pressure check. Pt is a good candidate for outpatient evaluation. Please consider referring patient for evaluation at OP Cardio Pulmonary Rehabilitation. Rehabilitation:  Cardiac Rehabilitation  Or  Acute Inpatient rehabilitation followed by Outpatient Cardiac/Pulmonary Rehabilitation    Criteria:  Cardiac:   1. Paroxymal Atrial fibrillation- c/o SOB and lightheadedness-afib with  intermittent slow ventricular response. 2. Chronic combined systolic and diastolic congestive heart failure  3.  Nonrheumatic mitral valve regurgitation- moderate MR on echo 12/19

## 2020-11-09 NOTE — ROUTINE PROCESS
Bedside and Verbal shift change report given to [employer] (oncoming nurse) by Ene Ornelas RN (offgoing nurse). Report included the following information SBAR, Kardex, MAR, Recent Results and Cardiac Rhythm AFib. Patient awake and alert, denies discomforts, call light in reach.

## 2020-11-09 NOTE — PROGRESS NOTES
[facility]      CARDIOLOGY PROGRESS NOTE  RECS:    1. Paroxymal Atrial fibrillation- c/o SOB and lightheadedness-afib with  intermittent slow ventricular response. continue Sotalol 80 mg bid. Daily ekg to check QT/QTc if QTc >500 ms HOLD medication and  call provider- plans for cardioversion tomorrow am if remains on afib- follow cbc and bmp. Continue Eliquis for stroke prevention   2. Hypertension- continue bb and ACEi    3. SOB- intermittent   4. Chronic combined systolic and diastolic congestive heart failure-compensated at his moment. Will monitor for s.s fluid overload   5. Nonrheumatic mitral valve regurgitation- moderate MR on echo 12/19     Echo 12/19   · Left Ventricle: Normal cavity size and wall thickness. Mild systolic dysfunction. Estimated left ventricular ejection fraction is 46 - 50%. Abnormal left ventricular wall motion. · Left Atrium: Severely dilated left atrium. · Right Ventricle: Moderately dilated right ventricle. Borderline low systolic function. · Right Atrium: Moderately dilated right atrium. · Mitral Valve: Mitral valve thickening. Moderate mitral valve regurgitation is present. · Tricuspid Valve: Mild tricuspid valve regurgitation is present. · Pulmonary Artery: There is no evidence of pulmonary hypertension. · Pulmonic Valve: Mild pulmonic valve regurgitation is present. · IVC/Hepatic Veins: Mildly elevated central venous pressure (5-10 mmHg); IVC diameter is less than 21 mm and collapses less than 50% with respiration.      ASSESSMENT:  Hospital Problems  Date Reviewed: 10/29/2020          Codes Class Noted POA    * (Principal) Paroxysmal atrial fibrillation ([facility] [address] ICD-10-CM: I48.0  ICD-9-CM: 427.31  11/8/2020 Unknown                SUBJECTIVE:  No CP  No SOB    OBJECTIVE:    VS:   Visit Vitals  BP (!) 147/85 (BP 1 Location: Right arm, BP Patient Position: At rest)   Pulse 61   Temp 97.7 °F (36.5 °C)   Resp 16   Ht 5' 3\" (1.6 m)   Wt 60.2 kg (132 lb 12.8 oz)   SpO2 99%   BMI 23.52 kg/m²         Intake/Output Summary (Last 24 hours) at 11/9/2020 0956  Last data filed at 11/9/2020 1443  Gross per 24 hour   Intake 320 ml   Output 300 ml   Net 20 ml     TELE: AFIB    General: alert, well developed, pleasant and in no apparent distress  HENT: Normocephalic, atraumatic. Normal external eye. Neck :  no bruit, no JVD  Cardiac:  irregularly irregular rhythm, no click, no rub  Lungs: clear to auscultation bilaterally  Abdomen: Soft, nontender, no masses  Extremities:  No c/c/e, peripheral pulses present      Labs: Results:       Chemistry No results for input(s): GLU, NA, K, CL, CO2, BUN, CREA, CA, AGAP, BUCR, TBIL, AP, TP, ALB, GLOB, AGRAT in the last 72 hours. No lab exists for component: GPT   CBC w/Diff No results for input(s): WBC, RBC, HGB, HCT, PLT, GRANS, LYMPH, EOS, HGBEXT, HCTEXT, PLTEXT in the last 72 hours. Cardiac Enzymes No results for input(s): CPK, CKND1, SANTIAGO in the last 72 hours. No lab exists for component: CKRMB, TROIP   Coagulation No results for input(s): PTP, INR, APTT, INREXT in the last 72 hours. Lipid Panel No results found for: CHOL, CHOLPOCT, CHOLX, CHLST, CHOLV, 601474, HDL, HDLP, LDL, LDLC, DLDLP, 307377, VLDLC, VLDL, TGLX, TRIGL, TRIGP, TGLPOCT, CHHD, CHHDX   BNP No results for input(s): BNPP in the last 72 hours. Liver Enzymes No results for input(s): TP, ALB, TBIL, AP in the last 72 hours. No lab exists for component: SGOT, GPT, DBIL   Thyroid Studies No results found for: T4, T3U, TSH, TSHEXT           Lisa Melo NP-HARSHAL Ranjana:[phone] supervised    I have independently evaluated and examined the patient. All relevant labs and testing data's are reviewed. Care plan discussed and updated after review.     Mordecai Lefort MD

## 2020-11-09 NOTE — PROGRESS NOTES
Problem: Falls - Risk of  Goal: *Absence of Falls  Description: Document [place] Fall Risk and appropriate interventions in the flowsheet.   Outcome: Progressing Towards Goal  Note: Fall Risk Interventions:            Medication Interventions: Patient to call before getting OOB, Teach patient to arise slowly

## 2020-11-10 ENCOUNTER — ANESTHESIA (OUTPATIENT)
Dept: CARDIAC CATH/INVASIVE PROCEDURES | Age: 65
End: 2020-11-10
Payer: MEDICARE

## 2020-11-10 ENCOUNTER — ANESTHESIA EVENT (OUTPATIENT)
Dept: CARDIAC CATH/INVASIVE PROCEDURES | Age: 65
End: 2020-11-10
Payer: MEDICARE

## 2020-11-10 VITALS
SYSTOLIC BLOOD PRESSURE: 114 MMHG | BODY MASS INDEX: 23.14 KG/M2 | TEMPERATURE: 97.7 F | OXYGEN SATURATION: 98 % | HEART RATE: 51 BPM | RESPIRATION RATE: 18 BRPM | DIASTOLIC BLOOD PRESSURE: 61 MMHG | WEIGHT: 130.6 LBS | HEIGHT: 63 IN

## 2020-11-10 LAB
ATRIAL RATE: 77 BPM
CALCULATED R AXIS, ECG10: 26 DEGREES
CALCULATED T AXIS, ECG11: 58 DEGREES
DIAGNOSIS, 93000: NORMAL
Q-T INTERVAL, ECG07: 436 MS
QRS DURATION, ECG06: 90 MS
QTC CALCULATION (BEZET), ECG08: 438 MS
VENTRICULAR RATE, ECG03: 61 BPM

## 2020-11-10 PROCEDURE — 93005 ELECTROCARDIOGRAM TRACING: CPT

## 2020-11-10 PROCEDURE — 74011000250 HC RX REV CODE- 250: Performed by: ANESTHESIOLOGY

## 2020-11-10 PROCEDURE — 99217 PR OBSERVATION CARE DISCHARGE MANAGEMENT: CPT | Performed by: INTERNAL MEDICINE

## 2020-11-10 PROCEDURE — 99218 HC RM OBSERVATION: CPT

## 2020-11-10 PROCEDURE — 76060000031 HC ANESTHESIA FIRST 0.5 HR: Performed by: INTERNAL MEDICINE

## 2020-11-10 PROCEDURE — 74011250636 HC RX REV CODE- 250/636: Performed by: ANESTHESIOLOGY

## 2020-11-10 PROCEDURE — 00537 ANES CARDIAC EP PROCEDURES: CPT | Performed by: ANESTHESIOLOGY

## 2020-11-10 PROCEDURE — 74011250637 HC RX REV CODE- 250/637: Performed by: INTERNAL MEDICINE

## 2020-11-10 PROCEDURE — 92960 CARDIOVERSION ELECTRIC EXT: CPT | Performed by: INTERNAL MEDICINE

## 2020-11-10 RX ORDER — LIDOCAINE HYDROCHLORIDE 20 MG/ML
INJECTION, SOLUTION EPIDURAL; INFILTRATION; INTRACAUDAL; PERINEURAL AS NEEDED
Status: DISCONTINUED | OUTPATIENT
Start: 2020-11-10 | End: 2020-11-10 | Stop reason: HOSPADM

## 2020-11-10 RX ORDER — SOTALOL HYDROCHLORIDE 80 MG/1
80 TABLET ORAL 2 TIMES DAILY
Qty: 60 TAB | Refills: 3 | Status: SHIPPED | OUTPATIENT
Start: 2020-11-10 | End: 2021-08-19 | Stop reason: ALTCHOICE

## 2020-11-10 RX ORDER — MIDAZOLAM HYDROCHLORIDE 1 MG/ML
INJECTION, SOLUTION INTRAMUSCULAR; INTRAVENOUS AS NEEDED
Status: DISCONTINUED | OUTPATIENT
Start: 2020-11-10 | End: 2020-11-10 | Stop reason: HOSPADM

## 2020-11-10 RX ORDER — PROPOFOL 10 MG/ML
INJECTION, EMULSION INTRAVENOUS AS NEEDED
Status: DISCONTINUED | OUTPATIENT
Start: 2020-11-10 | End: 2020-11-10 | Stop reason: HOSPADM

## 2020-11-10 RX ADMIN — SOTALOL HYDROCHLORIDE 80 MG: 80 TABLET ORAL at 09:09

## 2020-11-10 RX ADMIN — PROPOFOL 60 MG: 10 INJECTION, EMULSION INTRAVENOUS at 13:46

## 2020-11-10 RX ADMIN — LISINOPRIL 5 MG: 5 TABLET ORAL at 09:09

## 2020-11-10 RX ADMIN — APIXABAN 5 MG: 5 TABLET, FILM COATED ORAL at 09:09

## 2020-11-10 RX ADMIN — LIDOCAINE HYDROCHLORIDE 60 MG: 20 INJECTION, SOLUTION EPIDURAL; INFILTRATION; INTRACAUDAL; PERINEURAL at 13:46

## 2020-11-10 RX ADMIN — MIDAZOLAM HYDROCHLORIDE 2 MG: 2 INJECTION, SOLUTION INTRAMUSCULAR; INTRAVENOUS at 13:43

## 2020-11-10 NOTE — PROGRESS NOTES
[facility]      CARDIOLOGY PROGRESS NOTE  RECS:    1. Paroxymal Atrial fibrillation- c/o SOB and lightheadedness-afib with intermittent slow ventricular response. Continue Sotalol 80 mg bid. Daily ekg to check QT/QTc if QTc >500 ms HOLD medication and  call provider-cardioversion today. Discussed with patient risks and benefits. Patient wants to proceed. 2. Hypertension- continue bb and ACEi    3. SOB- better. 4. Chronic combined systolic and diastolic congestive heart failure-compensated at his moment. Will monitor for s/s fluid overload   5. Nonrheumatic mitral valve regurgitation- moderate MR on echo 12/19     For cardioversion today. Discussed with patient again and answered all her questions. Echo 12/19   · Left Ventricle: Normal cavity size and wall thickness. Mild systolic dysfunction. Estimated left ventricular ejection fraction is 46 - 50%. Abnormal left ventricular wall motion. · Left Atrium: Severely dilated left atrium. · Right Ventricle: Moderately dilated right ventricle. Borderline low systolic function. · Right Atrium: Moderately dilated right atrium. · Mitral Valve: Mitral valve thickening. Moderate mitral valve regurgitation is present. · Tricuspid Valve: Mild tricuspid valve regurgitation is present. · Pulmonary Artery: There is no evidence of pulmonary hypertension. · Pulmonic Valve: Mild pulmonic valve regurgitation is present. · IVC/Hepatic Veins: Mildly elevated central venous pressure (5-10 mmHg); IVC diameter is less than 21 mm and collapses less than 50% with respiration.      ASSESSMENT:  Hospital Problems  Date Reviewed: 10/29/2020          Codes Class Noted POA    * (Principal) Paroxysmal atrial fibrillation ([facility] [address] ICD-10-CM: I48.0  ICD-9-CM: 427.31  11/8/2020 Unknown                SUBJECTIVE:  No CP  No SOB    OBJECTIVE:    VS:   Visit Vitals  BP (!) 156/86 (BP 1 Location: Right arm, BP Patient Position: At rest)   Pulse 76   Temp 97.7 °F (36.5 °C)   Resp 16   Ht 5' 3\" (1.6 m)   Wt 59.2 kg (130 lb 9.6 oz)   SpO2 96%   BMI 23.13 kg/m²         Intake/Output Summary (Last 24 hours) at 11/10/2020 1139  Last data filed at 11/9/2020 2339  Gross per 24 hour   Intake --   Output 300 ml   Net -300 ml     TELE: AFIB    General: alert, well developed, pleasant and in no apparent distress  HENT: Normocephalic, atraumatic. Normal external eye. Neck :  no bruit, no JVD  Cardiac:  irregularly irregular rhythm, no click, no rub  Lungs: clear to auscultation bilaterally  Abdomen: Soft, nontender, no masses  Extremities:  No c/c/e, peripheral pulses present      Labs: Results:       Chemistry Recent Labs     11/09/20  1221   GLU 96      K 4.3      CO2 30   BUN 17   CREA 0.69   CA 8.7   AGAP 5   BUCR 25*      CBC w/Diff Recent Labs     11/09/20  1221   WBC 3.5*   RBC 3.15*   HGB 11.6*   HCT 33.9*         Cardiac Enzymes No results for input(s): CPK, CKND1, SANTIAGO in the last 72 hours. No lab exists for component: CKRMB, TROIP   Coagulation No results for input(s): PTP, INR, APTT, INREXT, INREXT in the last 72 hours. Lipid Panel No results found for: CHOL, CHOLPOCT, CHOLX, CHLST, CHOLV, 518539, HDL, HDLP, LDL, LDLC, DLDLP, 549941, VLDLC, VLDL, TGLX, TRIGL, TRIGP, TGLPOCT, CHHD, CHHDX   BNP No results for input(s): BNPP in the last 72 hours. Liver Enzymes No results for input(s): TP, ALB, TBIL, AP in the last 72 hours. No lab exists for component: SGOT, GPT, DBIL   Thyroid Studies No results found for: T4, T3U, TSH, TSHEXT, TSHEXT           Lisa Melo NP-C Ranjana:[phone]  I have independently evaluated and examined the patient. All relevant labs and testing data are reviewed. Care plan discussed and updated after review.   Juan Pablo Mehta MD

## 2020-11-10 NOTE — PROGRESS NOTES
Patient arrived to cath holding. PIV flushed. VSS. Patient NPO since midnight. Has lip piercing and nose piercing refusing to take out for procedure. Will make Fanny Castillo MD aware.

## 2020-11-10 NOTE — PROGRESS NOTES
Observation notice provided in writing to patient and/or caregiver as well as verbal explanation of the policy. Patients who are in outpatient status also receive the Observation notice    D/c today no needs.        China Vásquez, MSW  Case Management  [phone]

## 2020-11-10 NOTE — PROGRESS NOTES
TRANSFER - IN REPORT:    Verbal report received from jose c(name) on Eleuterio Bradley  being received from 2s(unit) for ordered procedure      Report consisted of patients Situation, Background, Assessment and   Recommendations(SBAR). Information from the following report(s) SBAR, Kardex, Intake/Output, MAR, Accordion and Recent Results was reviewed with the receiving nurse. Opportunity for questions and clarification was provided. Assessment completed upon patients arrival to unit and care assumed.

## 2020-11-10 NOTE — ANESTHESIA POSTPROCEDURE EVALUATION
Procedure(s):  EP CARDIOVERSION. MAC    Anesthesia Post Evaluation      Multimodal analgesia: multimodal analgesia used between 6 hours prior to anesthesia start to PACU discharge  Patient location during evaluation: bedside  Patient participation: complete - patient cannot participate  Level of consciousness: awake  Pain score: 1  Pain management: adequate  Airway patency: patent  Anesthetic complications: no  Cardiovascular status: acceptable  Respiratory status: acceptable  Hydration status: acceptable  Post anesthesia nausea and vomiting:  none      INITIAL Post-op Vital signs:   Vitals Value Taken Time   /87 11/10/2020  1:45 PM   Temp     Pulse 96 11/10/2020  1:49 PM   Resp 18 11/10/2020  1:49 PM   SpO2 100 % 11/10/2020  1:49 PM   Vitals shown include unvalidated device data.

## 2020-11-10 NOTE — DISCHARGE SUMMARY
Discharge Summary     Patient: Lorrine Aase MRN: [id number]  SSN: [id number]    YOB: 1955  Age: 76 y.o. Sex: female       Admit Date: 11/8/2020    Discharge Date: 11/10/2020      Admission Diagnoses: Paroxysmal atrial fibrillation ([facility] [I48.0]    Discharge Diagnoses:   Problem List as of 11/10/2020 Date Reviewed: 10/29/2020          Codes Class Noted - Resolved    * (Principal) Paroxysmal atrial fibrillation ([facility] ICD-10-CM: I48.0  ICD-9-CM: 427.31  11/8/2020 - Present        Atrial fibrillation ([facility] ICD-10-CM: I48.91  ICD-9-CM: 427.31  12/9/2019 - Present        Acute on chronic combined systolic and diastolic congestive heart failure ([facility] ICD-10-CM: I50.43  ICD-9-CM: 428.43, 428.0  12/9/2019 - Present        Nonrheumatic mitral valve regurgitation ICD-10-CM: I34.0  ICD-9-CM: 424.0  12/9/2019 - Present               Discharge Condition: Stable    Physical Exam: General: alert and in no apparent distress  HENT: Normocephalic, atraumatic. Normal external eye. Neck :  no bruit, no JVD  Cardiac:  regular rate and rhythm, S1, S2 normal, no murmur, click, rub or gallop  Chest/Lungs:chest clear, no wheezing, rales, normal symmetric air entry  Abdomen: Soft, nontender, no masses  Extremities:  No c/c/e, peripheral pulses present    Hospital Course: sotalol started 11/8/20, electrically cardioverted 11/10/20, normal QTc,   D/c today and f/u office in 2 wks    Consults: None    Significant Diagnostic Studies: see cc    Disposition: home    Discharge Medications:   Current Discharge Medication List      START taking these medications    Details   sotaloL (BETAPACE) 80 mg tablet Take 1 Tab by mouth two (2) times a day. Qty: 60 Tab, Refills: 3         CONTINUE these medications which have NOT CHANGED    Details   aspirin 81 mg chewable tablet Take 81 mg by mouth daily. furosemide (LASIX) 20 mg tablet Take 1 Tab by mouth daily as needed (Shortness of breath).  Skip if SBP less than 105  Qty: 90 Tab, Refills: 3    Associated Diagnoses: Chronic combined systolic and diastolic congestive heart failure (HCC)      lisinopriL (PRINIVIL, ZESTRIL) 5 mg tablet Take 1 Tab by mouth daily. Qty: 30 Tab, Refills: 1    Associated Diagnoses: Chronic combined systolic and diastolic congestive heart failure (HCC)      apixaban (ELIQUIS) 5 mg tablet Take 1 Tab by mouth two (2) times a day. Qty: 60 Tab, Refills: 6    Associated Diagnoses: Atrial fibrillation, unspecified type (HCC)      levocetirizine (XYZAL) 5 mg tablet Take  by mouth. STOP taking these medications       loratadine (Claritin) 10 mg tablet Comments:   Reason for Stopping:         digoxin (LANOXIN) 0.125 mg tablet Comments:   Reason for Stopping:         carvediloL (COREG) 12.5 mg tablet Comments:   Reason for Stopping:               Activity: Activity as tolerated  Diet: Cardiac Diet  Wound Care: Keep wound clean and dry    Follow-up Appointments   Procedures    FOLLOW UP VISIT Appointment in: Two Weeks With me with ekg     With me with ekg     Standing Status:   Standing     Number of Occurrences:   1     Order Specific Question:   Appointment in     Answer:    Two Weeks       Signed By: Faustino Davis MD     November 10, 2020

## 2020-11-10 NOTE — PROGRESS NOTES
Indication:  A Fib       After appropriate consent and IV sedation by anesthesia, single DC shock of 200 Joules of biphasic current converted the patient to sinus rhythm successfully without any complications. Conclusion:  Successful cardioversion of A Fib to Normal Sinus Rhythm. EKG requested.     D/c today if QTc good post CV

## 2020-11-10 NOTE — PROGRESS NOTES
Per nursing, walk test performed and patient does not qualify for home oxygen. Patient currently on room air. Plan to d/c home. [facility] Case Management [phone]

## 2020-11-10 NOTE — DISCHARGE INSTRUCTIONS
Patient Education       DISCHARGE SUMMARY from Nurse    PATIENT INSTRUCTIONS:    After general anesthesia or intravenous sedation, for 24 hours or while taking prescription Narcotics:  · Limit your activities  · Do not drive and operate hazardous machinery  · Do not make important personal or business decisions  · Do  not drink alcoholic beverages  · If you have not urinated within 8 hours after discharge, please contact your surgeon on call. Report the following to your surgeon:  · Excessive pain, swelling, redness or odor of or around the surgical area  · Temperature over 100.5  · Nausea and vomiting lasting longer than 4 hours or if unable to take medications  · Any signs of decreased circulation or nerve impairment to extremity: change in color, persistent  numbness, tingling, coldness or increase pain  · Any questions    What to do at Home:  Recommended activity: up as tolerated    If you experience any of the following symptoms chest pain, shortness of breath, dizziness,or any change in condition, please follow up with primary care doctor or call 911 if emergent. *  Please give a list of your current medications to your Primary Care Provider. *  Please update this list whenever your medications are discontinued, doses are      changed, or new medications (including over-the-counter products) are added. *  Please carry medication information at all times in case of emergency situations. These are general instructions for a healthy lifestyle:    No smoking/ No tobacco products/ Avoid exposure to second hand smoke  Surgeon General's Warning:  Quitting smoking now greatly reduces serious risk to your health.     Obesity, smoking, and sedentary lifestyle greatly increases your risk for illness    A healthy diet, regular physical exercise & weight monitoring are important for maintaining a healthy lifestyle    You may be retaining fluid if you have a history of heart failure or if you experience any of the following symptoms:  Weight gain of 3 pounds or more overnight or 5 pounds in a week, increased swelling in our hands or feet or shortness of breath while lying flat in bed. Please call your doctor as soon as you notice any of these symptoms; do not wait until your next office visit. The discharge information has been reviewed with the patient. The patient verbalized understanding. Discharge medications reviewed with the patient and appropriate educational materials and side effects teaching were provided. ___________________________________________________________________________________________________________________________________         Atrial Fibrillation: Care Instructions  Your Care Instructions     Atrial fibrillation is an irregular and often fast heartbeat. Treating this condition is important for several reasons. It can cause blood clots, which can travel from your heart to your brain and cause a stroke. If you have a fast heartbeat, you may feel lightheaded, dizzy, and weak. An irregular heartbeat can also increase your risk for heart failure. Atrial fibrillation is often the result of another heart condition, such as high blood pressure or coronary artery disease. Making changes to improve your heart condition will help you stay healthy and active. Follow-up care is a key part of your treatment and safety. Be sure to make and go to all appointments, and call your doctor if you are having problems. It's also a good idea to know your test results and keep a list of the medicines you take. How can you care for yourself at home? Medicines    · Take your medicines exactly as prescribed. Call your doctor if you think you are having a problem with your medicine. You will get more details on the specific medicines your doctor prescribes.     · If your doctor has given you a blood thinner to prevent a stroke, be sure you get instructions about how to take your medicine safely.  Blood thinners can cause serious bleeding problems.     · Do not take any vitamins, over-the-counter drugs, or herbal products without talking to your doctor first.   Lifestyle changes    · Do not smoke. Smoking can increase your chance of a stroke and heart attack. If you need help quitting, talk to your doctor about stop-smoking programs and medicines. These can increase your chances of quitting for good.     · Eat a heart-healthy diet.     · Stay at a healthy weight. Lose weight if you need to.     · Limit alcohol to 2 drinks a day for men and 1 drink a day for women. Too much alcohol can cause health problems.     · Avoid colds and flu. Get a pneumococcal vaccine shot. If you have had one before, ask your doctor whether you need another dose. Get a flu shot every year. If you must be around people with colds or flu, wash your hands often. Activity    · If your doctor recommends it, get more exercise. Walking is a good choice. Bit by bit, increase the amount you walk every day. Try for at least 30 minutes on most days of the week. You also may want to swim, bike, or do other activities. Your doctor may suggest that you join a cardiac rehabilitation program so that you can have help increasing your physical activity safely.     · Start light exercise if your doctor says it is okay. Even a small amount will help you get stronger, have more energy, and manage stress. Walking is an easy way to get exercise. Start out by walking a little more than you did in the hospital. Gradually increase the amount you walk.     · When you exercise, watch for signs that your heart is working too hard. You are pushing too hard if you cannot talk while you are exercising. If you become short of breath or dizzy or have chest pain, sit down and rest immediately.     · Check your pulse regularly. Place two fingers on the artery at the palm side of your wrist, in line with your thumb.  If your heartbeat seems uneven or fast, talk to your doctor. When should you call for help? Call 911 anytime you think you may need emergency care. For example, call if:    · You have symptoms of a heart attack. These may include:  ? Chest pain or pressure, or a strange feeling in the chest.  ? Sweating. ? Shortness of breath. ? Nausea or vomiting. ? Pain, pressure, or a strange feeling in the back, neck, jaw, or upper belly or in one or both shoulders or arms. ? Lightheadedness or sudden weakness. ? A fast or irregular heartbeat. After you call 911, the  may tell you to chew 1 adult-strength or 2 to 4 low-dose aspirin. Wait for an ambulance. Do not try to drive yourself.     · You have symptoms of a stroke. These may include:  ? Sudden numbness, tingling, weakness, or loss of movement in your face, arm, or leg, especially on only one side of your body. ? Sudden vision changes. ? Sudden trouble speaking. ? Sudden confusion or trouble understanding simple statements. ? Sudden problems with walking or balance. ? A sudden, severe headache that is different from past headaches.     · You passed out (lost consciousness). Call your doctor now or seek immediate medical care if:    · You have new or increased shortness of breath.     · You feel dizzy or lightheaded, or you feel like you may faint.     · Your heart rate becomes irregular.     · You can feel your heart flutter in your chest or skip heartbeats. Tell your doctor if these symptoms are new or worse. Watch closely for changes in your health, and be sure to contact your doctor if you have any problems. Where can you learn more? Go to http://www.gray.com/  Enter U020 in the search box to learn more about \"Atrial Fibrillation: Care Instructions. \"  Current as of: December 16, 2019               Content Version: 12.6  © 5753-6252 [employer], Incorporated.    Care instructions adapted under license by [employer] (which disclaims liability or warranty for this information). If you have questions about a medical condition or this instruction, always ask your healthcare professional. [place] [address] any warranty or liability for your use of this information.

## 2020-11-10 NOTE — PROGRESS NOTES
Problem: Falls - Risk of  Goal: *Absence of Falls  Description: Document Misael Click Fall Risk and appropriate interventions in the flowsheet.   Outcome: Progressing Towards Goal  Note: Fall Risk Interventions:            Medication Interventions: Bed/chair exit alarm

## 2020-11-10 NOTE — PROGRESS NOTES
TRANSFER - OUT REPORT:    Verbal report given to Kimmie(name) on [place]  being transferred to [facility](unit) for routine post - op       Report consisted of patients Situation, Background, Assessment and   Recommendations(SBAR). Information from the following report(s) SBAR, Procedure Summary, MAR and Recent Results was reviewed with the receiving nurse. Lines:   Peripheral IV 11/08/20 Posterior;Right Hand (Active)   Site Assessment Clean, dry, & intact 11/10/20 0810   Phlebitis Assessment 0 11/10/20 0810   Infiltration Assessment 0 11/10/20 0810   Dressing Status Clean, dry, & intact 11/10/20 0810   Dressing Type Tape;Transparent 11/10/20 0810   Hub Color/Line Status Blue;Flushed;Capped 11/10/20 0810   Action Taken Open ports on tubing capped 11/10/20 0810   Alcohol Cap Used Yes 11/10/20 0810        Opportunity for questions and clarification was provided.       Patient transported with:   [employer]

## 2020-11-10 NOTE — PROGRESS NOTES
Patient educated on and given discharge instructions with understanding noted. PIV removed per protocol. Patient dressed and belongings gathered. Patient states no questions or concerns at this time. Patient stable for discharge.

## 2020-11-10 NOTE — ANESTHESIA PREPROCEDURE EVALUATION
Relevant Problems   No relevant active problems       Anesthetic History   No history of anesthetic complications            Review of Systems / Medical History  Patient summary reviewed and pertinent labs reviewed    Pulmonary  Within defined limits                 Neuro/Psych   Within defined limits           Cardiovascular      Valvular problems/murmurs: mitral insufficiency      Dysrhythmias : atrial fibrillation      Exercise tolerance: <4 METS  Comments: Atrial fibrillation (HCC)  Acute on chronic combined systolic and diastolic congestive heart failure (HCC)  Nonrheumatic mitral valve regurgitation  Paroxysmal atrial fibrillation (HCC)  Other problems (0)     GI/Hepatic/Renal  Within defined limits              Endo/Other  Within defined limits           Other Findings   Comments: Echo:    Normal cavity size and wall thickness. Mild systolic dysfunction. The estimated ejection fraction is 46 - 50%. Abnormal wall motion as described on the wall scoring diagram below. Unable to assess diastolic function.  Atrial fibrillation observed         Physical Exam    Airway  Mallampati: II  TM Distance: 4 - 6 cm  Neck ROM: normal range of motion   Mouth opening: Normal     Cardiovascular  Regular rate and rhythm,  S1 and S2 normal,  no murmur, click, rub, or gallop  Rhythm: regular  Rate: normal         Dental  No notable dental hx       Pulmonary  Breath sounds clear to auscultation               Abdominal  GI exam deferred       Other Findings            Anesthetic Plan    ASA: 4  Anesthesia type: MAC          Induction: Intravenous  Anesthetic plan and risks discussed with: Patient

## 2020-11-11 LAB
ATRIAL RATE: 51 BPM
CALCULATED P AXIS, ECG09: 67 DEGREES
CALCULATED R AXIS, ECG10: 25 DEGREES
CALCULATED T AXIS, ECG11: 47 DEGREES
DIAGNOSIS, 93000: NORMAL
P-R INTERVAL, ECG05: 172 MS
Q-T INTERVAL, ECG07: 476 MS
QRS DURATION, ECG06: 90 MS
QTC CALCULATION (BEZET), ECG08: 438 MS
VENTRICULAR RATE, ECG03: 51 BPM

## 2020-11-23 ENCOUNTER — OFFICE VISIT (OUTPATIENT)
Dept: CARDIOLOGY CLINIC | Age: 65
End: 2020-11-23
Payer: MEDICARE

## 2020-11-23 VITALS
TEMPERATURE: 97.6 F | SYSTOLIC BLOOD PRESSURE: 141 MMHG | DIASTOLIC BLOOD PRESSURE: 64 MMHG | HEART RATE: 49 BPM | BODY MASS INDEX: 23.04 KG/M2 | HEIGHT: 63 IN | WEIGHT: 130 LBS

## 2020-11-23 DIAGNOSIS — I10 ESSENTIAL HYPERTENSION: ICD-10-CM

## 2020-11-23 DIAGNOSIS — R05.9 COUGH: ICD-10-CM

## 2020-11-23 DIAGNOSIS — I34.0 NONRHEUMATIC MITRAL VALVE REGURGITATION: ICD-10-CM

## 2020-11-23 DIAGNOSIS — I50.42 CHRONIC COMBINED SYSTOLIC AND DIASTOLIC CONGESTIVE HEART FAILURE (HCC): ICD-10-CM

## 2020-11-23 DIAGNOSIS — I48.20 CHRONIC ATRIAL FIBRILLATION (HCC): Primary | ICD-10-CM

## 2020-11-23 PROCEDURE — 1101F PT FALLS ASSESS-DOCD LE1/YR: CPT | Performed by: INTERNAL MEDICINE

## 2020-11-23 PROCEDURE — G8427 DOCREV CUR MEDS BY ELIG CLIN: HCPCS | Performed by: INTERNAL MEDICINE

## 2020-11-23 PROCEDURE — G8420 CALC BMI NORM PARAMETERS: HCPCS | Performed by: INTERNAL MEDICINE

## 2020-11-23 PROCEDURE — 3017F COLORECTAL CA SCREEN DOC REV: CPT | Performed by: INTERNAL MEDICINE

## 2020-11-23 PROCEDURE — G8432 DEP SCR NOT DOC, RNG: HCPCS | Performed by: INTERNAL MEDICINE

## 2020-11-23 PROCEDURE — G8536 NO DOC ELDER MAL SCRN: HCPCS | Performed by: INTERNAL MEDICINE

## 2020-11-23 PROCEDURE — 99214 OFFICE O/P EST MOD 30 MIN: CPT | Performed by: INTERNAL MEDICINE

## 2020-11-23 PROCEDURE — 93000 ELECTROCARDIOGRAM COMPLETE: CPT | Performed by: INTERNAL MEDICINE

## 2020-11-23 PROCEDURE — G8400 PT W/DXA NO RESULTS DOC: HCPCS | Performed by: INTERNAL MEDICINE

## 2020-11-23 PROCEDURE — 1111F DSCHRG MED/CURRENT MED MERGE: CPT | Performed by: INTERNAL MEDICINE

## 2020-11-23 PROCEDURE — 1090F PRES/ABSN URINE INCON ASSESS: CPT | Performed by: INTERNAL MEDICINE

## 2020-11-23 RX ORDER — LOSARTAN POTASSIUM 25 MG/1
25 TABLET ORAL DAILY
Qty: 30 TAB | Refills: 5 | Status: SHIPPED | OUTPATIENT
Start: 2020-11-23 | End: 2021-08-19 | Stop reason: SINTOL

## 2020-11-23 NOTE — PROGRESS NOTES
1. Have you been to the ER, urgent care clinic since your last visit? Hospitalized since your last visit?     no  2. Have you seen or consulted any other health care providers outside of the [address] [place] [address] since your last visit? Include any pap smears or colon screening. No     3. Since your last visit, have you had any of the following symptoms? shortness of breath. 4.  Have you had any blood work, X-rays or cardiac testing? No         5. Where do you normally have your labs drawn? 6. Do you need any refills today?    no

## 2020-11-23 NOTE — PATIENT INSTRUCTIONS
Medications Discontinued During This Encounter   Medication Reason    lisinopriL (PRINIVIL, ZESTRIL) 5 mg tablet Side Effects    aspirin 81 mg chewable tablet Alternate Therapy          Avoiding Triggers With Heart Failure: Care Instructions  Your Care Instructions     Triggers are anything that make your heart failure flare up. A flare-up is also called \"sudden heart failure\" or \"acute heart failure. \" When you have a flare-up, fluid builds up in your lungs, and you have problems breathing. You might need to go to the hospital. By watching for changes in your condition and avoiding triggers, you can prevent heart failure flare-ups. Follow-up care is a key part of your treatment and safety. Be sure to make and go to all appointments, and call your doctor if you are having problems. It's also a good idea to know your test results and keep a list of the medicines you take. How can you care for yourself at home? Watch for changes in your weight and condition  · Weigh yourself without clothing at the same time each day. Record your weight. Call your doctor if you have sudden weight gain, such as more than 2 to 3 pounds in a day or 5 pounds in a week. (Your doctor may suggest a different range of weight gain.) A sudden weight gain may mean that your heart failure is getting worse. · Keep a daily record of your symptoms. Write down any changes in how you feel, such as new shortness of breath, cough, or problems eating. Also record if your ankles are more swollen than usual and if you feel more tired than usual. Note anything that you ate or did that could have triggered these changes. Limit sodium  Sodium causes your body to hold on to extra water. This may cause your heart failure symptoms to get worse. People get most of their sodium from processed foods. Fast food and restaurant meals also tend to be very high in sodium. · Your doctor may suggest that you limit sodium.  Your doctor can tell you how much sodium is right for you. This includes limiting sodium in cooked and packaged foods. · Read food labels on cans and food packages. They tell you how much sodium you get in one serving. Check the serving size. If you eat more than one serving, you are getting more sodium. · Be aware that sodium can come in forms other than salt, including monosodium glutamate (MSG), sodium citrate, and sodium bicarbonate (baking soda). MSG is often added to Asian food. You can sometimes ask for food without MSG or salt. · Slowly reducing salt will help you adjust to the taste. Take the salt shaker off the table. · Flavor your food with garlic, lemon juice, onion, vinegar, herbs, and spices instead of salt. Do not use soy sauce, steak sauce, onion salt, garlic salt, mustard, or ketchup on your food, unless it is labeled \"low-sodium\" or \"low-salt. \"  · Make your own salad dressings, sauces, and ketchup without adding salt. · Use fresh or frozen ingredients, instead of canned ones, whenever you can. Choose low-sodium canned goods. · Eat less processed food and food from restaurants, including fast food. Exercise as directed  Moderate, regular exercise is very good for your heart. It improves your blood flow and helps control your weight. But too much exercise can stress your heart and cause a heart failure flare-up. · Check with your doctor before you start an exercise program.  · Walking is an easy way to get exercise. Start out slowly. Gradually increase the length and pace of your walk. Swimming, riding a bike, and using a treadmill are also good forms of exercise. · When you exercise, watch for signs that your heart is working too hard. You are pushing yourself too hard if you cannot talk while you are exercising. If you become short of breath or dizzy or have chest pain, stop, sit down, and rest.  · Do not exercise when you do not feel well. Take medicines correctly  · Take your medicines exactly as prescribed.  Call your doctor if you think you are having a problem with your medicine. · Make a list of all the medicines you take. Include those prescribed to you by other doctors and any over-the-counter medicines, vitamins, or supplements you take. Take this list with you when you go to any doctor. · Take your medicines at the same time every day. It may help you to post a list of all the medicines you take every day and what time of day you take them. · Make taking your medicine as simple as you can. Plan times to take your medicines when you are doing other things, such as eating a meal or getting ready for bed. This will make it easier to remember to take your medicines. · Get organized. Use helpful tools, such as daily or weekly pill containers. When should you call for help? Call 911 if you have symptoms of sudden heart failure such as:    · You have severe trouble breathing.     · You cough up pink, foamy mucus.     · You have a new irregular or rapid heartbeat. Call your doctor now or seek immediate medical care if:    · You have new or increased shortness of breath.     · You are dizzy or lightheaded, or you feel like you may faint.     · You have sudden weight gain, such as more than 2 to 3 pounds in a day or 5 pounds in a week. (Your doctor may suggest a different range of weight gain.)     · You have increased swelling in your legs, ankles, or feet.     · You are suddenly so tired or weak that you cannot do your usual activities. Watch closely for changes in your health, and be sure to contact your doctor if you develop new symptoms. Where can you learn more? Go to http://www.gray.com/  Enter V089 in the search box to learn more about \"Avoiding Triggers With Heart Failure: Care Instructions. \"  Current as of: December 16, 2019               Content Version: 12.6  © 3903-4489 [employer].    Care instructions adapted under license by [employer] (which disclaims liability or warranty for this information). If you have questions about a medical condition or this instruction, always ask your healthcare professional. [place] [address] any warranty or liability for your use of this information.

## 2020-11-28 DIAGNOSIS — I48.91 ATRIAL FIBRILLATION, UNSPECIFIED TYPE (HCC): ICD-10-CM

## 2020-11-30 RX ORDER — APIXABAN 5 MG/1
TABLET, FILM COATED ORAL
Qty: 60 TAB | Refills: 6 | Status: SHIPPED | OUTPATIENT
Start: 2020-11-30 | End: 2021-03-01 | Stop reason: SDUPTHER

## 2021-03-01 ENCOUNTER — OFFICE VISIT (OUTPATIENT)
Dept: CARDIOLOGY CLINIC | Age: 66
End: 2021-03-01
Payer: MEDICARE

## 2021-03-01 VITALS
DIASTOLIC BLOOD PRESSURE: 68 MMHG | HEIGHT: 63 IN | HEART RATE: 55 BPM | WEIGHT: 138 LBS | SYSTOLIC BLOOD PRESSURE: 140 MMHG | BODY MASS INDEX: 24.45 KG/M2 | TEMPERATURE: 98.3 F

## 2021-03-01 DIAGNOSIS — I50.42 CHRONIC COMBINED SYSTOLIC AND DIASTOLIC CONGESTIVE HEART FAILURE (HCC): Primary | ICD-10-CM

## 2021-03-01 DIAGNOSIS — I34.0 NONRHEUMATIC MITRAL VALVE REGURGITATION: ICD-10-CM

## 2021-03-01 DIAGNOSIS — R05.9 COUGH: ICD-10-CM

## 2021-03-01 DIAGNOSIS — I10 ESSENTIAL HYPERTENSION: ICD-10-CM

## 2021-03-01 DIAGNOSIS — R07.9 CHEST PAIN, UNSPECIFIED TYPE: ICD-10-CM

## 2021-03-01 DIAGNOSIS — I48.0 PAROXYSMAL ATRIAL FIBRILLATION (HCC): ICD-10-CM

## 2021-03-01 PROCEDURE — G8754 DIAS BP LESS 90: HCPCS | Performed by: INTERNAL MEDICINE

## 2021-03-01 PROCEDURE — G8432 DEP SCR NOT DOC, RNG: HCPCS | Performed by: INTERNAL MEDICINE

## 2021-03-01 PROCEDURE — G8536 NO DOC ELDER MAL SCRN: HCPCS | Performed by: INTERNAL MEDICINE

## 2021-03-01 PROCEDURE — 99215 OFFICE O/P EST HI 40 MIN: CPT | Performed by: INTERNAL MEDICINE

## 2021-03-01 PROCEDURE — 93000 ELECTROCARDIOGRAM COMPLETE: CPT | Performed by: INTERNAL MEDICINE

## 2021-03-01 PROCEDURE — 3017F COLORECTAL CA SCREEN DOC REV: CPT | Performed by: INTERNAL MEDICINE

## 2021-03-01 PROCEDURE — 1101F PT FALLS ASSESS-DOCD LE1/YR: CPT | Performed by: INTERNAL MEDICINE

## 2021-03-01 PROCEDURE — G8753 SYS BP > OR = 140: HCPCS | Performed by: INTERNAL MEDICINE

## 2021-03-01 PROCEDURE — G8427 DOCREV CUR MEDS BY ELIG CLIN: HCPCS | Performed by: INTERNAL MEDICINE

## 2021-03-01 PROCEDURE — G8400 PT W/DXA NO RESULTS DOC: HCPCS | Performed by: INTERNAL MEDICINE

## 2021-03-01 PROCEDURE — 1090F PRES/ABSN URINE INCON ASSESS: CPT | Performed by: INTERNAL MEDICINE

## 2021-03-01 PROCEDURE — G8420 CALC BMI NORM PARAMETERS: HCPCS | Performed by: INTERNAL MEDICINE

## 2021-03-01 NOTE — PATIENT INSTRUCTIONS
Learning About the [facility] Diet  What is the Mediterranean diet? The Mediterranean diet is a style of eating rather than a diet plan. It features foods eaten in [place], [place], [place], and other countries along the [facility]. It emphasizes eating foods like fish, fruits, vegetables, beans, high-fiber breads and whole grains, nuts, and olive oil. This style of eating includes limited red meat, cheese, and sweets. Why choose the Mediterranean diet? A Mediterranean-style diet may improve heart health. It contains more fat than other heart-healthy diets. But the fats are mainly from nuts, unsaturated oils (such as fish oils and olive oil), and certain nut or seed oils (such as canola, soybean, or flaxseed oil). These fats may help protect the heart and blood vessels. How can you get started on the Mediterranean diet? Here are some things you can do to switch to a more Mediterranean way of eating. What to eat  · Eat a variety of fruits and vegetables each day, such as grapes, blueberries, tomatoes, broccoli, peppers, figs, olives, spinach, eggplant, beans, lentils, and chickpeas. · Eat a variety of whole-grain foods each day, such as oats, brown rice, and whole wheat bread, pasta, and couscous. · Eat fish at least 2 times a week. Try tuna, salmon, mackerel, lake trout, herring, or sardines. · Eat moderate amounts of low-fat dairy products, such as milk, cheese, or yogurt. · Eat moderate amounts of poultry and eggs. · Choose healthy (unsaturated) fats, such as nuts, olive oil, and certain nut or seed oils like canola, soybean, and flaxseed. · Limit unhealthy (saturated) fats, such as butter, palm oil, and coconut oil. And limit fats found in animal products, such as meat and dairy products made with whole milk. Try to eat red meat only a few times a month in very small amounts. · Limit sweets and desserts to only a few times a week.  This includes sugar-sweetened drinks like soda. The Mediterranean diet may also include red wine with your meal--1 glass each day for women and up to 2 glasses a day for men. Tips for eating at home  · Use herbs, spices, garlic, lemon zest, and citrus juice instead of salt to add flavor to foods. · Add avocado slices to your sandwich instead of hernandez. · Have fish for lunch or dinner instead of red meat. Brush the fish with olive oil, and broil or grill it. · Sprinkle your salad with seeds or nuts instead of cheese. · Cook with olive or canola oil instead of butter or oils that are high in saturated fat. · Switch from 2% milk or whole milk to 1% or fat-free milk. · Dip raw vegetables in a vinaigrette dressing or hummus instead of dips made from mayonnaise or sour cream.  · Have a piece of fruit for dessert instead of a piece of cake. Try baked apples, or have some dried fruit. Tips for eating out  · Try broiled, grilled, baked, or poached fish instead of having it fried or breaded. · Ask your  to have your meals prepared with olive oil instead of butter. · Order dishes made with marinara sauce or sauces made from olive oil. Avoid sauces made from cream or mayonnaise. · Choose whole-grain breads, whole wheat pasta and pizza crust, brown rice, beans, and lentils. · Cut back on butter or margarine on bread. Instead, you can dip your bread in a small amount of olive oil. · Ask for a side salad or grilled vegetables instead of french fries or chips. Where can you learn more? Go to http://www.torres.com/  Enter O407 in the search box to learn more about \"Learning About the Mediterranean Diet. \"  Current as of: August 22, 2019               Content Version: 12.6  © 1963-9313 [employer]. Care instructions adapted under license by [employer] (which disclaims liability or warranty for this information).  If you have questions about a medical condition or this instruction, always ask your healthcare professional. [address] any warranty or liability for your use of this information.

## 2021-03-01 NOTE — PROGRESS NOTES
HISTORY OF PRESENT ILLNESS  Grant Celis is a 76 y.o. female. Follow-up of paroxysmal atrial flutter fibrillation, status post cardioversion, CHF, MR    11/20 complains of dry cough    Palpitations   The history is provided by the patient. This is a new problem. The current episode started more than 1 week ago (7/19). The problem has been resolved. The problem occurs every several days. On average, each episode lasts 2 minutes. The problem is associated with nothing (ADLs). Associated symptoms include malaise/fatigue (11/20 improving), chest pain, dizziness (occasional) and shortness of breath. Pertinent negatives include no diaphoresis, no fever, no claudication, no orthopnea, no PND, no nausea, no vomiting, no headaches and no cough. CHF  The history is provided by the medical records. This is a chronic problem. Associated symptoms include chest pain and shortness of breath. Pertinent negatives include no headaches. Shortness of Breath  The history is provided by the patient. This is a new problem. The problem occurs intermittently. The current episode started more than 1 week ago. The problem has been gradually improving. Associated symptoms include chest pain. Pertinent negatives include no fever, no headaches, no cough, no wheezing, no PND, no orthopnea, no vomiting, no rash, no leg swelling and no claudication. The problem's precipitants include exercise (Walking within the house; 1/20 steps; 3/21 cleaning a lot; ). Chest Pain (Angina)   The history is provided by the patient. This is a new problem. The current episode started more than 1 week ago. Duration of episode(s) is 1 minute. The problem occurs rarely (3/month). The pain is associated with normal activity and rest. The pain is present in the lateral region and left side. The pain is mild. The quality of the pain is described as vice-like. The pain does not radiate.  Associated symptoms include dizziness (occasional), malaise/fatigue (11/20 improving), palpitations and shortness of breath. Pertinent negatives include no claudication, no cough, no diaphoresis, no fever, no headaches, no nausea, no orthopnea, no PND and no vomiting. She has tried nothing for the symptoms. Review of Systems   Constitutional: Positive for malaise/fatigue (11/20 improving). Negative for chills, diaphoresis, fever and weight loss. HENT: Negative for nosebleeds. Eyes: Negative for discharge. Respiratory: Positive for shortness of breath. Negative for cough and wheezing. Cardiovascular: Positive for chest pain and palpitations. Negative for orthopnea, claudication, leg swelling and PND. Gastrointestinal: Negative for diarrhea, nausea and vomiting. Genitourinary: Negative for dysuria and hematuria. Musculoskeletal: Negative for joint pain. Skin: Negative for rash. Neurological: Positive for dizziness (occasional). Negative for seizures, loss of consciousness and headaches. Endo/Heme/Allergies: Negative for polydipsia. Does not bruise/bleed easily. Psychiatric/Behavioral: Negative for depression and substance abuse. The patient does not have insomnia. No Known Allergies    Past Medical History:   Diagnosis Date    Essential hypertension 11/23/2020       Family History   Problem Relation Age of Onset    Stroke Brother 47    Heart Attack Neg Hx        Social History     Tobacco Use    Smoking status: Never Smoker    Smokeless tobacco: Never Used   Substance Use Topics    Alcohol use: Not Currently     Frequency: Never    Drug use: Not Currently        Current Outpatient Medications   Medication Sig    Eliquis 5 mg tablet take 1 tablet by mouth twice a day    losartan (COZAAR) 25 mg tablet Take 1 Tab by mouth daily.  sotaloL (BETAPACE) 80 mg tablet Take 1 Tab by mouth two (2) times a day.  furosemide (LASIX) 20 mg tablet Take 1 Tab by mouth daily as needed (Shortness of breath).  Skip if SBP less than 105    levocetirizine (XYZAL) 5 mg tablet Take  by mouth. No current facility-administered medications for this visit. Past Surgical History:   Procedure Laterality Date    HX LAP CHOLECYSTECTOMY      IN CARDIOVERSION ELECTIVE ARRHYTHMIA EXTERNAL N/A 11/10/2020    EP CARDIOVERSION performed by Shantel Higginbotham MD at [facility] CATH LAB       Visit Vitals  BP (!) 140/68   Pulse (!) 55   Temp 98.3 °F (36.8 °C) (Temporal)   Ht 5' 3\" (1.6 m)   Wt 62.6 kg (138 lb)   BMI 24.45 kg/m²       Diagnostic Studies:  I have reviewed the relevant tests done on the patient and show as follows  EKG tracings reviewed by me today. EKG Results     Procedure 720 Value Units Date/Time    AMB POC EKG ROUTINE W/ 12 LEADS, INTER & REP [[id number]] Resulted: 03/01/21 1214    Order Status: Completed Updated: 03/01/21 1204        XR Results (most recent):  No results found for this or any previous visit. 12/03/19   ECHO ADULT COMPLETE 12/03/2019 12/3/2019    Narrative · Left Ventricle: Normal cavity size and wall thickness. Mild systolic   dysfunction. Estimated left ventricular ejection fraction is 46 - 50%. Abnormal left ventricular wall motion. · Left Atrium: Severely dilated left atrium. · Right Ventricle: Moderately dilated right ventricle. Borderline low   systolic function. · Right Atrium: Moderately dilated right atrium. · Mitral Valve: Mitral valve thickening. Moderate mitral valve   regurgitation is present. · Tricuspid Valve: Mild tricuspid valve regurgitation is present. · Pulmonary Artery: There is no evidence of pulmonary hypertension. · Pulmonic Valve: Mild pulmonic valve regurgitation is present. · IVC/Hepatic Veins: Mildly elevated central venous pressure (5-10 mmHg);   IVC diameter is less than 21 mm and collapses less than 50% with   respiration.         Signed by: Shantel Higginbotham MD     12/03/19   NUCLEAR CARDIAC STRESS TEST 12/03/2019 12/4/2019    Narrative · Gated SPECT: Left ventricular function post-stress was normal.   Calculated ejection fraction is 57%. There is no evidence of transient   ischemic dilation (TID). The TID ratio is 0.8. · Baseline ECG: Atrial fibrillation, non-specific ST-T wave abnormalities. · Negative stress test.  · Left ventricular perfusion is normal.  · Negative myocardial perfusion imaging. Myocardial perfusion imaging   supports a low risk stress test.        Signed by: Amira Petit MD       Ms. Kendra Franklin has a reminder for a \"due or due soon\" health maintenance. I have asked that she contact her primary care provider for follow-up on this health maintenance. Physical Exam   Constitutional: She is oriented to person, place, and time. She appears well-developed and well-nourished. No distress. HENT:   Head: Normocephalic and atraumatic. Mouth/Throat: Normal dentition. Eyes: Right eye exhibits no discharge. Left eye exhibits no discharge. No scleral icterus. Neck: Neck supple. No JVD present. Carotid bruit is not present. No thyromegaly present. Cardiovascular: Normal rate, S1 normal, S2 normal, normal heart sounds and intact distal pulses. An irregularly irregular rhythm present. Exam reveals no gallop and no friction rub. No murmur heard. Pulmonary/Chest: Effort normal and breath sounds normal. She has no wheezes. She has no rales. Abdominal: Soft. She exhibits no mass. There is no abdominal tenderness. Musculoskeletal:         General: No edema. Lymphadenopathy:        Right cervical: No superficial cervical adenopathy present. Left cervical: No superficial cervical adenopathy present. Neurological: She is alert and oriented to person, place, and time. Skin: Skin is warm and dry. No rash noted. Psychiatric: She has a normal mood and affect.  Her behavior is normal.       ASSESSMENT and PLAN    Atrial Fibrillation CHADSVASC2 Score Stroke Risk:   76 y.o. 72 to 76  +1   female Female +1   CHF HX: No    + 0   HTN HX: No    + 0   Stroke/TIA/Thromboembolism No    +0 Vascular Disease HX: No    + 0   Diabetes Mellitus No    + 0   CHADSVASC 2 Score 2      Annual Stroke Risk 2.2%- moderate-high        MR: 12/19 mild to moderate  Cardiomyopathy: 12/19 45 to 50% EF, dilated LA, RA, RV  Cardioversion: Successful 11/20;    11/20 status post cardioversion and is doing well. EKG with normal QTC. Tolerating sotalol well. Complains of dry cough and still has mild hypertension. Change lisinopril to losartan. Diet and exercise discussed. CHF is responding to medications fairly well. NYHA class II. Continue present dose of sotalol. Discontinue aspirin and continue Eliquis. Diagnoses and all orders for this visit:    1. Chronic combined systolic and diastolic congestive heart failure (HCC)  -     ECHO ADULT COMPLETE; Future    2. Paroxysmal atrial fibrillation (HCC)  -     AMB POC EKG ROUTINE W/ 12 LEADS, INTER & REP    3. Essential hypertension    4. Nonrheumatic mitral valve regurgitation  -     ECHO ADULT COMPLETE; Future    5. Cough  Comments:  Improved when ACE inhibitor was discontinued in 11/20    6. Atrial fibrillation, unspecified type (HCC)  -     apixaban (Eliquis) 5 mg tablet; Take 1 Tab by mouth two (2) times a day. 7. Chest pain, unspecified type        Pertinent laboratory and test data reviewed and discussed with patient. See patient instructions also for other medical advice given    Medications Discontinued During This Encounter   Medication Reason    Eliquis 5 mg tablet REORDER       Follow-up and Dispositions    · Return in about 3 months (around 6/1/2021), or if symptoms worsen or fail to improve, for post test, with ekg.       3/21 shortness of breath is overall improving further. Edema is rare. CHF is compensated NYHA class II. A. fib is staying into sinus bradycardia. Continue Eliquis. Blood pressure is mildly elevated but patient gets mildly dizzy post medications, so I will not increase the medicines any further yet.   Cough has improved since lisinopril switch to losartan. Check echo to follow-up on LV function as well as MR. Chest pain is atypical.  Had a negative stress test a year ago. No further work-up. Eduardo Graham

## 2021-03-01 NOTE — PROGRESS NOTES
1. Have you been to the ER, urgent care clinic since your last visit? Hospitalized since your last visit?     no    2. Have you seen or consulted any other health care providers outside of the [address] [place] [address] since your last visit? Include any pap smears or colon screening. No     3. Since your last visit, have you had any of the following symptoms? shortness of breath. 4.  Have you had any blood work, X-rays or cardiac testing? No         5. Where do you normally have your labs drawn? 6. Do you need any refills today?    no

## 2021-04-08 ENCOUNTER — OFFICE VISIT (OUTPATIENT)
Dept: NEUROLOGY | Age: 66
End: 2021-04-08
Payer: MEDICARE

## 2021-04-08 VITALS
HEART RATE: 69 BPM | BODY MASS INDEX: 23.14 KG/M2 | TEMPERATURE: 97.7 F | DIASTOLIC BLOOD PRESSURE: 74 MMHG | OXYGEN SATURATION: 98 % | WEIGHT: 130.6 LBS | SYSTOLIC BLOOD PRESSURE: 142 MMHG | HEIGHT: 63 IN | RESPIRATION RATE: 18 BRPM

## 2021-04-08 DIAGNOSIS — R25.1 TREMOR: Primary | ICD-10-CM

## 2021-04-08 PROCEDURE — 1101F PT FALLS ASSESS-DOCD LE1/YR: CPT | Performed by: STUDENT IN AN ORGANIZED HEALTH CARE EDUCATION/TRAINING PROGRAM

## 2021-04-08 PROCEDURE — 99203 OFFICE O/P NEW LOW 30 MIN: CPT | Performed by: STUDENT IN AN ORGANIZED HEALTH CARE EDUCATION/TRAINING PROGRAM

## 2021-04-08 PROCEDURE — G8754 DIAS BP LESS 90: HCPCS | Performed by: STUDENT IN AN ORGANIZED HEALTH CARE EDUCATION/TRAINING PROGRAM

## 2021-04-08 PROCEDURE — 1090F PRES/ABSN URINE INCON ASSESS: CPT | Performed by: STUDENT IN AN ORGANIZED HEALTH CARE EDUCATION/TRAINING PROGRAM

## 2021-04-08 PROCEDURE — G8432 DEP SCR NOT DOC, RNG: HCPCS | Performed by: STUDENT IN AN ORGANIZED HEALTH CARE EDUCATION/TRAINING PROGRAM

## 2021-04-08 PROCEDURE — G8536 NO DOC ELDER MAL SCRN: HCPCS | Performed by: STUDENT IN AN ORGANIZED HEALTH CARE EDUCATION/TRAINING PROGRAM

## 2021-04-08 PROCEDURE — G8420 CALC BMI NORM PARAMETERS: HCPCS | Performed by: STUDENT IN AN ORGANIZED HEALTH CARE EDUCATION/TRAINING PROGRAM

## 2021-04-08 PROCEDURE — G8400 PT W/DXA NO RESULTS DOC: HCPCS | Performed by: STUDENT IN AN ORGANIZED HEALTH CARE EDUCATION/TRAINING PROGRAM

## 2021-04-08 PROCEDURE — G8427 DOCREV CUR MEDS BY ELIG CLIN: HCPCS | Performed by: STUDENT IN AN ORGANIZED HEALTH CARE EDUCATION/TRAINING PROGRAM

## 2021-04-08 PROCEDURE — 3017F COLORECTAL CA SCREEN DOC REV: CPT | Performed by: STUDENT IN AN ORGANIZED HEALTH CARE EDUCATION/TRAINING PROGRAM

## 2021-04-08 PROCEDURE — G8753 SYS BP > OR = 140: HCPCS | Performed by: STUDENT IN AN ORGANIZED HEALTH CARE EDUCATION/TRAINING PROGRAM

## 2021-04-08 NOTE — PROGRESS NOTES
Susanne Arevalo is a 76 y.o. female . presents for New Patient Mk Oswald MD) and Tremors (bilateral hand)   . A 76years old female patient with medical history of hypertension and atrial fibrillation on Eliquis here for evaluation of tremor of about 3 years duration. She claims it is getting worse. Mostly with activity. Gets worse when she is holding something. But no significant impairment in her activities like holding cups (does not spill) or utensils. No resting tremor. No involvement of the head and neck. No lower extremity tremor at rest.  No changes in her speech/voice. She claims she has occasional stiffness: her arms become stiff when waking up but gets better. No difficulty walking. She has occasional balance difficulties but no falls. She does not shuffle her gait. No slowness. No drooling from her mouth. No swallowing difficulty. No problems sleeping and no difficulty turning in bed. No hallucinations. No family stroke tremor. She occasionally drinks alcohol and did not observe any changes in the tremor with alcohol. No obvious triggers for her tremors. She has history of atrial fibrillation and is on Eliquis. No history of thyroid problems. Last TSH level I could get is from 2014 which is normal.      Review of Systems   Constitutional: Negative for chills, fever and weight loss. HENT: Positive for hearing loss (sometime) and tinnitus. Eyes: Negative for blurred vision and double vision. Respiratory: Positive for shortness of breath. Negative for cough. Cardiovascular: Negative for chest pain and leg swelling. Gastrointestinal: Negative for nausea and vomiting. Genitourinary: Negative for dysuria, frequency and urgency. Musculoskeletal: Negative for back pain and neck pain. Skin: Negative for itching and rash. Neurological: Positive for dizziness (sometimes), tingling (sometimes), tremors and headaches (sometimes).  Negative for speech change, focal weakness and seizures. Endo/Heme/Allergies: Bruises/bleeds easily. Psychiatric/Behavioral: Negative for depression. The patient is not nervous/anxious.         Past Medical History:   Diagnosis Date    Essential hypertension 11/23/2020       Past Surgical History:   Procedure Laterality Date    HX LAP CHOLECYSTECTOMY      AL CARDIOVERSION ELECTIVE ARRHYTHMIA EXTERNAL N/A 11/10/2020    EP CARDIOVERSION [facility] Teodora Martin [facility]        Family History   Problem Relation Age of Onset    Stroke Brother 47    Heart Attack Neg Hx         Social History     Socioeconomic History    Marital status:      Spouse name: Not on file    Number of children: Not on file    Years of education: Not on file    Highest education level: Not on file   Occupational History    Not on file   Social Needs    Financial resource strain: Not on file    Food insecurity     Worry: Not on file     Inability: Not on file   [employer] needs     Medical: Not on file     Non-medical: Not on file   Tobacco Use    Smoking status: Never Smoker    Smokeless tobacco: Never Used   Substance and Sexual Activity    Alcohol use: Not Currently     Frequency: Never    Drug use: Not Currently    Sexual activity: Not Currently   Lifestyle    Physical activity     Days per week: Not on file     Minutes per session: Not on file    Stress: Not on file   Relationships    Social connections     Talks on phone: Not on file     Gets together: Not on file     Attends Quaker service: Not on file     Active member of club or organization: Not on file     Attends meetings of clubs or organizations: Not on file     Relationship status: Not on file    Intimate partner violence     Fear of current or ex partner: Not on file     Emotionally abused: Not on file     Physically abused: Not on file     Forced sexual activity: Not on file   Other Topics Concern    Not on file   Social History Narrative    Not on file Allergies   Allergen Reactions    Lisinopril Cough         Current Outpatient Medications   Medication Sig Dispense Refill    apixaban (Eliquis) 5 mg tablet Take 1 Tab by mouth two (2) times a day. 60 Tab 6    losartan (COZAAR) 25 mg tablet Take 1 Tab by mouth daily. 30 Tab 5    sotaloL (BETAPACE) 80 mg tablet Take 1 Tab by mouth two (2) times a day. 60 Tab 3    furosemide (LASIX) 20 mg tablet Take 1 Tab by mouth daily as needed (Shortness of breath). Skip if SBP less than 105 90 Tab 3    levocetirizine (XYZAL) 5 mg tablet Take  by mouth. Physical Exam  Constitutional:       Appearance: Normal appearance. HENT:      Head: Normocephalic and atraumatic. Mouth/Throat:      Mouth: Mucous membranes are moist.      Pharynx: Oropharynx is clear. No oropharyngeal exudate. Eyes:      Extraocular Movements: Extraocular movements intact. Pupils: Pupils are equal, round, and reactive to light. Neck:      Musculoskeletal: Normal range of motion and neck supple. Pulmonary:      Effort: Pulmonary effort is normal. No respiratory distress. Musculoskeletal: Normal range of motion. Right lower leg: No edema. Left lower leg: No edema. Neurological:      Mental Status: She is alert. Comments: Mental status: Awake, alert, oriented x3, follows simple and complex commands, no neglect, no extinction to DSS or VSS. Speech and languge: fluent, coherent,  and comprehension intact  CN: VFF, EOMI, PERRLA, face sensation intact , no facial asymmetry noted, palate elevation symmetric bilat, SS+SCM 5/5 bilat, tongue midline  Motor: no pronator drift, tone normal throughout, strength 5/5 throughout  Sensory: intact to light touch throughout  Coordination: FNF, HS accurate w/o dysmetria. Mild fine postural tremor of the fingers bilaterally. DTR: 2+ throughout. Gait: Normal            No visits with results within 3 Month(s) from this visit.    Latest known visit with results is:   Admission on 11/08/2020, Discharged on 11/10/2020   Component Date Value Ref Range Status    Ventricular Rate 11/09/2020 48  BPM Final    Atrial Rate 11/09/2020 46  BPM Final    QRS Duration 11/09/2020 90  ms Final    Q-T Interval 11/09/2020 468  ms Final    QTC Calculation (Bezet) 11/09/2020 418  ms Final    Calculated R Axis 11/09/2020 27  degrees Final    Calculated T Axis 11/09/2020 43  degrees Final    Diagnosis 11/09/2020    Final                    Value:Atrial fibrillation with slow ventricular response  T wave abnormality, consider anterior ischemia  Abnormal ECG  No previous ECGs available  Confirmed by Citlali Polo MD, Len Gutierrez ([id number]) on 11/9/2020 1:13:47 PM      WBC 11/09/2020 3.5* 4.6 - 13.2 K/uL Final    RBC 11/09/2020 3.15* 4.20 - 5.30 M/uL Final    HGB 11/09/2020 11.6* 12.0 - 16.0 g/dL Final    HCT 11/09/2020 33.9* 35.0 - 45.0 % Final    MCV 11/09/2020 107.6* 74.0 - 97.0 FL Final    MCH 11/09/2020 36.8* 24.0 - 34.0 PG Final    MCHC 11/09/2020 34.2  31.0 - 37.0 g/dL Final    RDW 11/09/2020 14.1  11.6 - 14.5 % Final    PLATELET 62/70/6899 677  135 - 420 K/uL Final    MPV 11/09/2020 9.9  9.2 - 11.8 FL Final    Sodium 11/09/2020 142  136 - 145 mmol/L Final    Potassium 11/09/2020 4.3  3.5 - 5.5 mmol/L Final    Chloride 11/09/2020 107  100 - 111 mmol/L Final    CO2 11/09/2020 30  21 - 32 mmol/L Final    Anion gap 11/09/2020 5  3.0 - 18 mmol/L Final    Glucose 11/09/2020 96  74 - 99 mg/dL Final    BUN 11/09/2020 17  7.0 - 18 MG/DL Final    Creatinine 11/09/2020 0.69  0.6 - 1.3 MG/DL Final    BUN/Creatinine ratio 11/09/2020 25* 12 - 20   Final    GFR est AA 11/09/2020 >60  >60 ml/min/1.73m2 Final    GFR est non-AA 11/09/2020 >60  >60 ml/min/1.73m2 Final    Comment: (NOTE)  Estimated GFR is calculated using the Modification of Diet in Renal   Disease (MDRD) Study equation, reported for both  Americans   (GFRAA) and non- Americans (GFRNA), and normalized to 1.73m2   body surface area. The physician must decide which value applies to   the patient. The MDRD study equation should only be used in   individuals age 25 or older. It has not been validated for the   following: pregnant women, patients with serious comorbid conditions,   or on certain medications, or persons with extremes of body size,   muscle mass, or nutritional status.  Calcium 11/09/2020 8.7  8.5 - 10.1 MG/DL Final    Ventricular Rate 11/10/2020 61  BPM Final    Atrial Rate 11/10/2020 77  BPM Final    QRS Duration 11/10/2020 90  ms Final    Q-T Interval 11/10/2020 436  ms Final    QTC Calculation (Bezet) 11/10/2020 438  ms Final    Calculated R Axis 11/10/2020 26  degrees Final    Calculated T Axis 11/10/2020 58  degrees Final    Diagnosis 11/10/2020    Final                    Value:Atrial fibrillation  T wave abnormality, consider anterior ischemia or digitalis effect  Abnormal ECG  When compared with ECG of 09-NOV-2020 07:34,  No significant change was found  Confirmed by Yusuf Holman MD, --- ([id number]) on 11/10/2020 10:02:03 AM      Ventricular Rate 11/10/2020 51  BPM Final    Atrial Rate 11/10/2020 51  BPM Final    P-R Interval 11/10/2020 172  ms Final    QRS Duration 11/10/2020 90  ms Final    Q-T Interval 11/10/2020 476  ms Final    QTC Calculation (Bezet) 11/10/2020 438  ms Final    Calculated P Axis 11/10/2020 67  degrees Final    Calculated R Axis 11/10/2020 25  degrees Final    Calculated T Axis 11/10/2020 47  degrees Final    Diagnosis 11/10/2020    Final                    Value:Sinus bradycardia with premature atrial complexes  Minimal voltage criteria for LVH, may be normal variant  Borderline ECG  When compared with ECG of 10-NOV-2020 04:50,  Sinus rhythm has replaced Atrial fibrillation  Confirmed by Vangie Dominguez M.D., [address] [place] [address] ([id number]) on 11/11/2020 12:27:22 PM               ICD-10-CM ICD-9-CM    1.  Tremor  R25.1 781.0 TSH 3RD GENERATION     A 58-year-old female patient here for evaluation of tremor of her upper extremities of about 3 years duration. Gradually getting worse. No significant impairment in her activities. Mostly postural and with activities. No significant family story. No additional PD symptoms. Possible essential/physiologic tremor. We will get a TSH level. No need for medications at this time. Will observe her. She will call our office if any significant impairment in her ADL  because of the tremor. We will see her again in 6 months time.

## 2021-04-08 NOTE — PATIENT INSTRUCTIONS
Benign Essential Tremor: Care Instructions  Your Care Instructions     Benign essential tremor is a medical term for shaking that you can't control. Your hand or fingers may shake when you lift a cup or point at something. Or your voice may shake when you speak. This type of tremor is not harmful. It is not caused by a stroke or Parkinson's disease. Some things can affect how much you shake. For example, drinking or eating something with caffeine may make tremors worse for a while. Some medicines also can increase tremors. These include antidepressants and too much thyroid replacement. Talk to your doctor if you think one of your medicines makes your tremors worse. If you are self-conscious about your tremors, there are some things you can do to reduce them or make them less noticeable. This includes taking medicine. Follow-up care is a key part of your treatment and safety. Be sure to make and go to all appointments, and call your doctor if you are having problems. It's also a good idea to know your test results and keep a list of the medicines you take. How can you care for yourself at home? · Take your medicines exactly as prescribed. Call your doctor if you think you are having a problem with your medicine. Some medicines that help control tremors have to be taken every day, even if you are not having tremors. You will get more details on the specific medicines your doctor prescribes. · Get plenty of rest.  · Eat a balanced, healthy diet. · Try to reduce stress. Regular exercise and massages may help. · Limit alcohol. Heavy drinking can make your tremors worse. · Avoid drinks or foods with caffeine if they make your tremors worse. These include tea, cola, coffee, and chocolate. · Wear a heavy bracelet or watch. This adds a little weight to your hand. The extra weight may reduce tremors. · Drink from cups or glasses that are only half full. You may also want to try drinking with a straw.   When should you call for help? Watch closely for changes in your health, and be sure to contact your doctor if:    · You notice your tremors are getting worse.     · You can't do your everyday activities because of your tremors.     · You are sad and embarrassed about your shaking. Where can you learn more? Go to http://www.gray.com/  Enter B746 in the search box to learn more about \"Benign Essential Tremor: Care Instructions. \"  Current as of: August 4, 2020               Content Version: 12.8  © 1633-9615 [employer]. Care instructions adapted under license by [employer] (which disclaims liability or warranty for this information). If you have questions about a medical condition or this instruction, always ask your healthcare professional. [address] any warranty or liability for your use of this information.

## 2021-04-08 NOTE — LETTER
4/11/2021 Patient: Kailee Deluca YOB: 1955 Date of Visit: 4/8/2021 Karen Teixeira MD 
[address] [place], [place] [id number] [place] [id number] Via Fax: [phone] Dear Karen Teixeira MD, Thank you for referring Ms. Kailee Deluca to [facility] for evaluation. My notes for this consultation are attached. If you have questions, please do not hesitate to call me. I look forward to following your patient along with you. Sincerely, Sis Spaulding MD

## 2021-08-19 ENCOUNTER — OFFICE VISIT (OUTPATIENT)
Dept: CARDIOLOGY CLINIC | Age: 66
End: 2021-08-19
Payer: MEDICARE

## 2021-08-19 VITALS
SYSTOLIC BLOOD PRESSURE: 134 MMHG | WEIGHT: 125 LBS | HEIGHT: 63 IN | DIASTOLIC BLOOD PRESSURE: 73 MMHG | HEART RATE: 69 BPM | BODY MASS INDEX: 22.15 KG/M2

## 2021-08-19 DIAGNOSIS — I34.0 NONRHEUMATIC MITRAL VALVE REGURGITATION: ICD-10-CM

## 2021-08-19 DIAGNOSIS — R63.4 WEIGHT LOSS: ICD-10-CM

## 2021-08-19 DIAGNOSIS — I10 ESSENTIAL HYPERTENSION: ICD-10-CM

## 2021-08-19 DIAGNOSIS — I48.0 PAROXYSMAL ATRIAL FIBRILLATION (HCC): Primary | ICD-10-CM

## 2021-08-19 DIAGNOSIS — I50.42 CHRONIC COMBINED SYSTOLIC AND DIASTOLIC CONGESTIVE HEART FAILURE (HCC): ICD-10-CM

## 2021-08-19 PROCEDURE — G8427 DOCREV CUR MEDS BY ELIG CLIN: HCPCS | Performed by: INTERNAL MEDICINE

## 2021-08-19 PROCEDURE — 93000 ELECTROCARDIOGRAM COMPLETE: CPT | Performed by: INTERNAL MEDICINE

## 2021-08-19 PROCEDURE — G8420 CALC BMI NORM PARAMETERS: HCPCS | Performed by: INTERNAL MEDICINE

## 2021-08-19 PROCEDURE — G8754 DIAS BP LESS 90: HCPCS | Performed by: INTERNAL MEDICINE

## 2021-08-19 PROCEDURE — 99215 OFFICE O/P EST HI 40 MIN: CPT | Performed by: INTERNAL MEDICINE

## 2021-08-19 PROCEDURE — G8400 PT W/DXA NO RESULTS DOC: HCPCS | Performed by: INTERNAL MEDICINE

## 2021-08-19 PROCEDURE — 1090F PRES/ABSN URINE INCON ASSESS: CPT | Performed by: INTERNAL MEDICINE

## 2021-08-19 PROCEDURE — 1101F PT FALLS ASSESS-DOCD LE1/YR: CPT | Performed by: INTERNAL MEDICINE

## 2021-08-19 PROCEDURE — 3017F COLORECTAL CA SCREEN DOC REV: CPT | Performed by: INTERNAL MEDICINE

## 2021-08-19 PROCEDURE — G8752 SYS BP LESS 140: HCPCS | Performed by: INTERNAL MEDICINE

## 2021-08-19 PROCEDURE — G8536 NO DOC ELDER MAL SCRN: HCPCS | Performed by: INTERNAL MEDICINE

## 2021-08-19 PROCEDURE — G8432 DEP SCR NOT DOC, RNG: HCPCS | Performed by: INTERNAL MEDICINE

## 2021-08-19 RX ORDER — AMIODARONE HYDROCHLORIDE 200 MG/1
200 TABLET ORAL 2 TIMES DAILY
Qty: 60 TABLET | Refills: 0 | Status: SHIPPED | OUTPATIENT
Start: 2021-08-19 | End: 2021-09-21

## 2021-08-19 NOTE — PROGRESS NOTES
1. Have you been to the ER, urgent care clinic since your last visit? Hospitalized since your last visit?     no  2. Have you seen or consulted any other health care providers outside of the [address] [place] [address] since your last visit? Include any pap smears or colon screening. Yes Where: pcp     3. Since your last visit, have you had any of the following symptoms? shortness of breath. 4.  Have you had any blood work, X-rays or cardiac testing? No         5. Where do you normally have your labs drawn?   [place]  6. Do you need any refills today?    no

## 2021-08-19 NOTE — PATIENT INSTRUCTIONS
Medications Discontinued During This Encounter   Medication Reason    losartan (COZAAR) 25 mg tablet Side Effects    sotaloL (BETAPACE) 80 mg tablet Therapy Completed     After the recommended changes have been made in blood pressure medicines, patient advised to keep BP/HR(pulse rate) chart twice daily and bring us results in next office visit. Patient may send the results via \"My Chart\" if desired. Please rest for 5-10 minutes before checking blood pressure. Sit on a comfortable chair without crossing the legs and put your arm on a table. We recommend that you use an upper arm cuff. Check the blood pressure 3 times each time you check the blood pressure and record the lowest reading. If you check the blood pressure in both arms, use the higher reading. Atrial Fibrillation: Care Instructions  Your Care Instructions     Atrial fibrillation is an irregular and often fast heartbeat. Treating this condition is important for several reasons. It can cause blood clots, which can travel from your heart to your brain and cause a stroke. If you have a fast heartbeat, you may feel lightheaded, dizzy, and weak. An irregular heartbeat can also increase your risk for heart failure. Atrial fibrillation is often the result of another heart condition, such as high blood pressure or coronary artery disease. Making changes to improve your heart condition will help you stay healthy and active. Follow-up care is a key part of your treatment and safety. Be sure to make and go to all appointments, and call your doctor if you are having problems. It's also a good idea to know your test results and keep a list of the medicines you take. How can you care for yourself at home? Medicines    · Take your medicines exactly as prescribed. Call your doctor if you think you are having a problem with your medicine.  You will get more details on the specific medicines your doctor prescribes.     · If your doctor has given you a blood thinner to prevent a stroke, be sure you get instructions about how to take your medicine safely. Blood thinners can cause serious bleeding problems.     · Do not take any vitamins, over-the-counter drugs, or herbal products without talking to your doctor first.   Lifestyle changes    · Do not smoke. Smoking can increase your chance of a stroke and heart attack. If you need help quitting, talk to your doctor about stop-smoking programs and medicines. These can increase your chances of quitting for good.     · Eat a heart-healthy diet.     · Stay at a healthy weight. Lose weight if you need to.     · Limit alcohol to 2 drinks a day for men and 1 drink a day for women. Too much alcohol can cause health problems.     · Avoid colds and flu. Get a pneumococcal vaccine shot. If you have had one before, ask your doctor whether you need another dose. Get a flu shot every year. If you must be around people with colds or flu, wash your hands often. Activity    · If your doctor recommends it, get more exercise. Walking is a good choice. Bit by bit, increase the amount you walk every day. Try for at least 30 minutes on most days of the week. You also may want to swim, bike, or do other activities. Your doctor may suggest that you join a cardiac rehabilitation program so that you can have help increasing your physical activity safely.     · Start light exercise if your doctor says it is okay. Even a small amount will help you get stronger, have more energy, and manage stress. Walking is an easy way to get exercise. Start out by walking a little more than you did in the hospital. Gradually increase the amount you walk.     · When you exercise, watch for signs that your heart is working too hard. You are pushing too hard if you cannot talk while you are exercising. If you become short of breath or dizzy or have chest pain, sit down and rest immediately.     · Check your pulse regularly.  Place two fingers on the artery at the palm side of your wrist, in line with your thumb. If your heartbeat seems uneven or fast, talk to your doctor. When should you call for help? Call 911 anytime you think you may need emergency care. For example, call if:    · You have symptoms of a heart attack. These may include:  ? Chest pain or pressure, or a strange feeling in the chest.  ? Sweating. ? Shortness of breath. ? Nausea or vomiting. ? Pain, pressure, or a strange feeling in the back, neck, jaw, or upper belly or in one or both shoulders or arms. ? Lightheadedness or sudden weakness. ? A fast or irregular heartbeat. After you call 911, the  may tell you to chew 1 adult-strength or 2 to 4 low-dose aspirin. Wait for an ambulance. Do not try to drive yourself.     · You have symptoms of a stroke. These may include:  ? Sudden numbness, tingling, weakness, or loss of movement in your face, arm, or leg, especially on only one side of your body. ? Sudden vision changes. ? Sudden trouble speaking. ? Sudden confusion or trouble understanding simple statements. ? Sudden problems with walking or balance. ? A sudden, severe headache that is different from past headaches.     · You passed out (lost consciousness). Call your doctor now or seek immediate medical care if:    · You have new or increased shortness of breath.     · You feel dizzy or lightheaded, or you feel like you may faint.     · Your heart rate becomes irregular.     · You can feel your heart flutter in your chest or skip heartbeats. Tell your doctor if these symptoms are new or worse. Watch closely for changes in your health, and be sure to contact your doctor if you have any problems. Where can you learn more? Go to http://www.gray.com/  Enter U020 in the search box to learn more about \"Atrial Fibrillation: Care Instructions. \"  Current as of: August 31, 2020               Content Version: 12.8  © 0748-4610 [employer], [facility].    Care instructions adapted under license by [employer] (which disclaims liability or warranty for this information). If you have questions about a medical condition or this instruction, always ask your healthcare professional. [address] any warranty or liability for your use of this information.

## 2021-08-19 NOTE — PROGRESS NOTES
HISTORY OF PRESENT ILLNESS  Geremias Bender is a 78 y.o. female. Follow-up of paroxysmal atrial flutter fibrillation, status post cardioversion, CHF, MR    8/21 recurrent A. fib with dyspnea worsening for about 4 months. 11/20 complains of dry cough    CHF  The history is provided by the medical records. This is a chronic problem. Associated symptoms include chest pain and shortness of breath. Pertinent negatives include no headaches. Palpitations   The history is provided by the patient. This is a new problem. The current episode started more than 1 week ago (7/19; ). The problem has been resolved. The problem occurs every several days. On average, each episode lasts 2 minutes. The problem is associated with nothing (ADLs). Associated symptoms include malaise/fatigue (11/20 improving), chest pain, dizziness (occasional) and shortness of breath. Pertinent negatives include no diaphoresis, no fever, no claudication, no orthopnea, no PND, no nausea, no vomiting, no headaches and no cough. Shortness of Breath  The history is provided by the patient. This is a new problem. The problem occurs intermittently. The current episode started more than 1 week ago. The problem has been gradually worsening (4/21). Associated symptoms include chest pain. Pertinent negatives include no fever, no headaches, no cough, no wheezing, no PND, no orthopnea, no vomiting, no rash, no leg swelling and no claudication. The problem's precipitants include exercise (Walking within the house; 1/20 steps; 3/21 cleaning a lot; 8/21 steps, yard work; ). Chest Pain (Angina)   The history is provided by the patient. This is a new problem. The current episode started more than 1 week ago. The problem has been rapidly improving. Duration of episode(s) is 1 minute. The problem occurs rarely (3/month; 8/21 1-2/month; ). The pain is associated with normal activity and rest. The pain is present in the lateral region and left side. The pain is mild.  The quality of the pain is described as vice-like. The pain does not radiate. Associated symptoms include dizziness (occasional), malaise/fatigue (11/20 improving), palpitations and shortness of breath. Pertinent negatives include no claudication, no cough, no diaphoresis, no fever, no headaches, no nausea, no orthopnea, no PND and no vomiting. She has tried nothing for the symptoms. Review of Systems   Constitutional: Positive for malaise/fatigue (11/20 improving). Negative for chills, diaphoresis, fever and weight loss. HENT: Negative for nosebleeds. Eyes: Negative for discharge. Respiratory: Positive for shortness of breath. Negative for cough and wheezing. Cardiovascular: Positive for chest pain and palpitations. Negative for orthopnea, claudication, leg swelling and PND. Gastrointestinal: Negative for diarrhea, nausea and vomiting. Genitourinary: Negative for dysuria and hematuria. Musculoskeletal: Negative for joint pain. Skin: Negative for rash. Neurological: Positive for dizziness (occasional). Negative for seizures, loss of consciousness and headaches. Endo/Heme/Allergies: Negative for polydipsia. Does not bruise/bleed easily. Psychiatric/Behavioral: Negative for depression and substance abuse. The patient does not have insomnia. Allergies   Allergen Reactions    Lisinopril Cough       Past Medical History:   Diagnosis Date    Essential hypertension 11/23/2020       Family History   Problem Relation Age of Onset    Stroke Brother 47    Heart Attack Neg Hx        Social History     Tobacco Use    Smoking status: Never Smoker    Smokeless tobacco: Never Used   Substance Use Topics    Alcohol use: Not Currently    Drug use: Not Currently        Current Outpatient Medications   Medication Sig    apixaban (Eliquis) 5 mg tablet Take 1 Tab by mouth two (2) times a day.  sotaloL (BETAPACE) 80 mg tablet Take 1 Tab by mouth two (2) times a day.     furosemide (LASIX) 20 mg tablet Take 1 Tab by mouth daily as needed (Shortness of breath). Skip if SBP less than 105    levocetirizine (XYZAL) 5 mg tablet Take  by mouth. No current facility-administered medications for this visit. Past Surgical History:   Procedure Laterality Date    HX LAP CHOLECYSTECTOMY      AR CARDIOVERSION ELECTIVE ARRHYTHMIA EXTERNAL N/A 11/10/2020    EP CARDIOVERSION performed by Lizzie Crigler, MD at [facility] CATH LAB       Visit Vitals  /73   Pulse 69   Ht 5' 3\" (1.6 m)   Wt 56.7 kg (125 lb)   BMI 22.14 kg/m²       Diagnostic Studies:  I have reviewed the relevant tests done on the patient and show as follows  EKG tracings reviewed by me today. EKG Results     Procedure 720 Value Units Date/Time    AMB POC EKG ROUTINE W/ 12 LEADS, INTER & REP [[id number]] Resulted: 08/19/21 0831    Order Status: Completed Updated: 08/19/21 0819        XR Results (most recent):  No results found for this or any previous visit. 12/03/19   ECHO ADULT COMPLETE 12/03/2019 12/3/2019    Narrative · Left Ventricle: Normal cavity size and wall thickness. Mild systolic   dysfunction. Estimated left ventricular ejection fraction is 46 - 50%. Abnormal left ventricular wall motion. · Left Atrium: Severely dilated left atrium. · Right Ventricle: Moderately dilated right ventricle. Borderline low   systolic function. · Right Atrium: Moderately dilated right atrium. · Mitral Valve: Mitral valve thickening. Moderate mitral valve   regurgitation is present. · Tricuspid Valve: Mild tricuspid valve regurgitation is present. · Pulmonary Artery: There is no evidence of pulmonary hypertension. · Pulmonic Valve: Mild pulmonic valve regurgitation is present. · IVC/Hepatic Veins: Mildly elevated central venous pressure (5-10 mmHg);   IVC diameter is less than 21 mm and collapses less than 50% with   respiration.         Signed by: Lizzie Crigler, MD     12/03/19   NUCLEAR CARDIAC STRESS TEST 12/03/2019 12/4/2019    Narrative · Gated SPECT: Left ventricular function post-stress was normal.   Calculated ejection fraction is 57%. There is no evidence of transient   ischemic dilation (TID). The TID ratio is 0.8. · Baseline ECG: Atrial fibrillation, non-specific ST-T wave abnormalities. · Negative stress test.  · Left ventricular perfusion is normal.  · Negative myocardial perfusion imaging. Myocardial perfusion imaging   supports a low risk stress test.        Signed by: Kevin Andersen MD       Ms. Radha Campo has a reminder for a \"due or due soon\" health maintenance. I have asked that she contact her primary care provider for follow-up on this health maintenance. Physical Exam  Constitutional:       General: She is not in acute distress. Appearance: She is well-developed. HENT:      Head: Normocephalic and atraumatic. Mouth/Throat:      Dentition: Normal dentition. Eyes:      General: No scleral icterus. Right eye: No discharge. Left eye: No discharge. Neck:      Thyroid: No thyromegaly. Vascular: No carotid bruit or JVD. Cardiovascular:      Rate and Rhythm: Normal rate. Rhythm irregularly irregular. Pulses: Intact distal pulses. Heart sounds: Normal heart sounds, S1 normal and S2 normal. No murmur heard. No friction rub. No gallop. Pulmonary:      Effort: Pulmonary effort is normal.      Breath sounds: Normal breath sounds. No wheezing or rales. Abdominal:      Palpations: Abdomen is soft. There is no mass. Tenderness: There is no abdominal tenderness. Musculoskeletal:      Cervical back: Neck supple. Lymphadenopathy:      Cervical:      Right cervical: No superficial cervical adenopathy. Left cervical: No superficial cervical adenopathy. Skin:     General: Skin is warm and dry. Findings: No rash. Neurological:      Mental Status: She is alert and oriented to person, place, and time.    Psychiatric:         Behavior: Behavior normal. ASSESSMENT and PLAN    Atrial Fibrillation CHADSVASC2 Score Stroke Risk:   78 y.o. 72 to 76  +1   female Female +1   CHF HX: No    + 0   HTN HX: No    + 0   Stroke/TIA/Thromboembolism No    +0   Vascular Disease HX: No    + 0   Diabetes Mellitus No    + 0   CHADSVASC 2 Score 2      Annual Stroke Risk 2.2%- moderate-high        MR: 12/19 mild to moderate  Cardiomyopathy: 12/19 45 to 50% EF, dilated LA, RA, RV  Cardioversion: Successful 11/20; recurrent A. fib 8/21 with [facility];    11/20 status post cardioversion and is doing well. EKG with normal QTC. Tolerating sotalol well. Complains of dry cough and still has mild hypertension. Change lisinopril to losartan. Diet and exercise discussed. CHF is responding to medications fairly well. NYHA class II. Continue present dose of sotalol. Discontinue aspirin and continue Eliquis. 3/21 shortness of breath is overall improving further. Edema is rare. CHF is compensated NYHA class II. A. fib is staying into sinus bradycardia. Continue Eliquis. Blood pressure is mildly elevated but patient gets mildly dizzy post medications, so I will not increase the medicines any further yet. Cough has improved since lisinopril switch to losartan. Check echo to follow-up on LV function as well as MR. Chest pain is atypical.  Had a negative stress test a year ago. No further work-up. .      Diagnoses and all orders for this visit:    1. Paroxysmal atrial fibrillation (HCC)  -     AMB POC EKG ROUTINE W/ 12 LEADS, INTER & REP  -     amiodarone (CORDARONE) 200 mg tablet; Take 1 Tablet by mouth two (2) times a day. -     HEPATIC FUNCTION PANEL; Future  -     PFT DLCO; Future  -     PULMONARY FUNCTION TEST; Future  -     TSH 3RD GENERATION; Future  -     ECHO ADULT COMPLETE; Future    2. Essential hypertension    3. Chronic combined systolic and diastolic congestive heart failure (HCC)  -     ECHO ADULT COMPLETE; Future    4. Nonrheumatic mitral valve regurgitation    5. Weight loss  -     METABOLIC PANEL, BASIC; Future  -     CBC W/O DIFF; Future        Pertinent laboratory and test data reviewed and discussed with patient. See patient instructions also for other medical advice given    Medications Discontinued During This Encounter   Medication Reason    losartan (COZAAR) 25 mg tablet Side Effects    sotaloL (BETAPACE) 80 mg tablet Therapy Completed       Follow-up and Dispositions    · Return in about 6 weeks (around 9/30/2021), or if symptoms worsen or fail to improve, for with ekg, post test.       8/19/2021 A. fib has recurred. She is feeling more dyspneic. Losartan held as she was feeling sick, likely had low pressure along with sotalol. Explained to try losartan again as sotalol is being discontinued and switched to amiodarone. Labs for amiodarone follow-up as ordered. Discussed with patient the choice between amiodarone and ablation. She would prefer medication first.  She is losing significant weight. Told her to see the PCP but will include basic labs today. Blood pressure to be monitored at home.

## 2021-09-01 ENCOUNTER — TELEPHONE (OUTPATIENT)
Dept: CARDIOLOGY CLINIC | Age: 66
End: 2021-09-01

## 2021-09-01 NOTE — TELEPHONE ENCOUNTER
Patient stating when she was in the office she was told to take her amiodarone bid for 2-3 weeks and than start taking it once a day. She would like to know if that is what she should be doing. Please advise.

## 2021-09-02 ENCOUNTER — HOSPITAL ENCOUNTER (OUTPATIENT)
Dept: LAB | Age: 66
Discharge: HOME | End: 2021-09-02

## 2021-09-02 ENCOUNTER — HOSPITAL ENCOUNTER (OUTPATIENT)
Dept: RESPIRATORY THERAPY | Age: 66
Discharge: HOME | End: 2021-09-02
Attending: INTERNAL MEDICINE
Payer: MEDICARE

## 2021-09-02 DIAGNOSIS — R05.9 COUGH: ICD-10-CM

## 2021-09-02 DIAGNOSIS — I48.0 PAROXYSMAL ATRIAL FIBRILLATION (HCC): ICD-10-CM

## 2021-09-02 LAB — XX-LABCORP SPECIMEN COL,LCBCF: NORMAL

## 2021-09-02 PROCEDURE — 94729 DIFFUSING CAPACITY: CPT

## 2021-09-02 PROCEDURE — 94727 GAS DIL/WSHOT DETER LNG VOL: CPT

## 2021-09-02 PROCEDURE — 99001 SPECIMEN HANDLING PT-LAB: CPT

## 2021-09-02 PROCEDURE — 94060 EVALUATION OF WHEEZING: CPT

## 2021-09-03 DIAGNOSIS — I10 ESSENTIAL HYPERTENSION: Primary | ICD-10-CM

## 2021-09-03 LAB
ALBUMIN SERPL-MCNC: 4.2 G/DL (ref 3.8–4.8)
ALP SERPL-CCNC: 49 IU/L (ref 48–121)
ALT SERPL-CCNC: 15 IU/L (ref 0–32)
AST SERPL-CCNC: 22 IU/L (ref 0–40)
BILIRUB DIRECT SERPL-MCNC: 0.09 MG/DL (ref 0–0.4)
BILIRUB SERPL-MCNC: 0.2 MG/DL (ref 0–1.2)
BUN SERPL-MCNC: 19 MG/DL (ref 8–27)
BUN/CREAT SERPL: 20 (ref 12–28)
CALCIUM SERPL-MCNC: 8.7 MG/DL (ref 8.7–10.3)
CHLORIDE SERPL-SCNC: 102 MMOL/L (ref 96–106)
CO2 SERPL-SCNC: 25 MMOL/L (ref 20–29)
CREAT SERPL-MCNC: 0.94 MG/DL (ref 0.57–1)
ERYTHROCYTE [DISTWIDTH] IN BLOOD BY AUTOMATED COUNT: 15.8 % (ref 11.7–15.4)
GLUCOSE SERPL-MCNC: 85 MG/DL (ref 65–99)
HCT VFR BLD AUTO: 33 % (ref 34–46.6)
HGB BLD-MCNC: 10.9 G/DL (ref 11.1–15.9)
MCH RBC QN AUTO: 37.5 PG (ref 26.6–33)
MCHC RBC AUTO-ENTMCNC: 33 G/DL (ref 31.5–35.7)
MCV RBC AUTO: 113 FL (ref 79–97)
PLATELET # BLD AUTO: 231 X10E3/UL (ref 150–450)
POTASSIUM SERPL-SCNC: 4.3 MMOL/L (ref 3.5–5.2)
PROT SERPL-MCNC: 7.1 G/DL (ref 6–8.5)
RBC # BLD AUTO: 2.91 X10E6/UL (ref 3.77–5.28)
SODIUM SERPL-SCNC: 140 MMOL/L (ref 134–144)
SPECIMEN STATUS REPORT, ROLRST: NORMAL
TSH SERPL DL<=0.005 MIU/L-ACNC: 6.29 UIU/ML (ref 0.45–4.5)
WBC # BLD AUTO: 3.5 X10E3/UL (ref 3.4–10.8)

## 2021-09-03 PROCEDURE — 94729 DIFFUSING CAPACITY: CPT | Performed by: INTERNAL MEDICINE

## 2021-09-03 PROCEDURE — 94726 PLETHYSMOGRAPHY LUNG VOLUMES: CPT | Performed by: INTERNAL MEDICINE

## 2021-09-03 PROCEDURE — 94060 EVALUATION OF WHEEZING: CPT | Performed by: INTERNAL MEDICINE

## 2021-09-03 RX ORDER — LEVOTHYROXINE SODIUM 25 UG/1
25 TABLET ORAL
Qty: 30 TABLET | Refills: 3 | Status: SHIPPED | OUTPATIENT
Start: 2021-09-03 | End: 2022-01-12 | Stop reason: SDUPTHER

## 2021-09-03 NOTE — TELEPHONE ENCOUNTER
Requested Prescriptions     Pending Prescriptions Disp Refills    levothyroxine (SYNTHROID) 25 mcg tablet 30 Tablet 3     Sig: Take 1 Tablet by mouth Daily (before breakfast).

## 2021-09-03 NOTE — TELEPHONE ENCOUNTER
----- Message from Altagracia Campo MD sent at 9/3/2021  4:57 PM EDT -----  Please contact patient and do the following asap  Start synthroid 25 mcg/day  Rpt TSH in 2 months

## 2021-09-03 NOTE — TELEPHONE ENCOUNTER
Spoke with patient to start Synthroid 25 mcg a day and Rpt TSH in 2 months per Dr. Jarred Hartley. She voices understanding and acceptance of this advice and will call back if any further questions or concerns.

## 2021-09-21 DIAGNOSIS — I48.0 PAROXYSMAL ATRIAL FIBRILLATION (HCC): ICD-10-CM

## 2021-09-21 RX ORDER — AMIODARONE HYDROCHLORIDE 200 MG/1
TABLET ORAL
Qty: 60 TABLET | Refills: 0 | Status: SHIPPED | OUTPATIENT
Start: 2021-09-21 | End: 2021-09-23

## 2021-09-23 ENCOUNTER — TELEPHONE (OUTPATIENT)
Dept: CARDIOLOGY CLINIC | Age: 66
End: 2021-09-23

## 2021-09-23 DIAGNOSIS — I48.0 PAROXYSMAL ATRIAL FIBRILLATION (HCC): ICD-10-CM

## 2021-09-23 RX ORDER — AMIODARONE HYDROCHLORIDE 200 MG/1
200 TABLET ORAL DAILY
Qty: 90 TABLET | Refills: 0 | Status: SHIPPED | OUTPATIENT
Start: 2021-09-23 | End: 2021-11-29 | Stop reason: SDUPTHER

## 2021-09-23 NOTE — TELEPHONE ENCOUNTER
Patient want to know if she should continue to take Amiodarone and if so she needs new RX sent to Pharmacy d/t you only gave her 60 tablets and no refills. Please send RX to [facility] pharmacy. Patient due have labs drawn in November.  Please advise

## 2021-09-23 NOTE — TELEPHONE ENCOUNTER
Let her know that it needs to be reduced to once a day now and I have sent a 90-day prescription. I will see her on the next appointment.

## 2021-09-23 NOTE — TELEPHONE ENCOUNTER
Spoke with patient per Dr. Leonardo Kramer, let her know that it needs to be reduced to once a day now and new prescriptions has been sent to Pharmacy. He will see her on next appointment. She voices understanding and acceptance of this advice and will call back if any further questions or concerns.

## 2021-09-27 LAB
T4 FREE SERPL-MCNC: 1.4 NG/DL (ref 0.9–1.8)
TSH SERPL DL<=0.005 MIU/L-ACNC: 4.18 MCU/ML (ref 0.27–4.2)

## 2021-10-29 ENCOUNTER — OFFICE VISIT (OUTPATIENT)
Dept: CARDIOLOGY CLINIC | Age: 66
End: 2021-10-29
Payer: MEDICARE

## 2021-10-29 VITALS
HEIGHT: 63 IN | WEIGHT: 126.8 LBS | BODY MASS INDEX: 22.47 KG/M2 | SYSTOLIC BLOOD PRESSURE: 145 MMHG | DIASTOLIC BLOOD PRESSURE: 87 MMHG | HEART RATE: 85 BPM

## 2021-10-29 DIAGNOSIS — I34.0 NONRHEUMATIC MITRAL VALVE REGURGITATION: ICD-10-CM

## 2021-10-29 DIAGNOSIS — I50.42 CHRONIC COMBINED SYSTOLIC AND DIASTOLIC CONGESTIVE HEART FAILURE (HCC): ICD-10-CM

## 2021-10-29 DIAGNOSIS — I48.0 PAROXYSMAL ATRIAL FIBRILLATION (HCC): Primary | ICD-10-CM

## 2021-10-29 DIAGNOSIS — I10 ESSENTIAL HYPERTENSION: ICD-10-CM

## 2021-10-29 PROCEDURE — G8754 DIAS BP LESS 90: HCPCS | Performed by: INTERNAL MEDICINE

## 2021-10-29 PROCEDURE — 1101F PT FALLS ASSESS-DOCD LE1/YR: CPT | Performed by: INTERNAL MEDICINE

## 2021-10-29 PROCEDURE — G8510 SCR DEP NEG, NO PLAN REQD: HCPCS | Performed by: INTERNAL MEDICINE

## 2021-10-29 PROCEDURE — 3017F COLORECTAL CA SCREEN DOC REV: CPT | Performed by: INTERNAL MEDICINE

## 2021-10-29 PROCEDURE — 1090F PRES/ABSN URINE INCON ASSESS: CPT | Performed by: INTERNAL MEDICINE

## 2021-10-29 PROCEDURE — 93000 ELECTROCARDIOGRAM COMPLETE: CPT | Performed by: INTERNAL MEDICINE

## 2021-10-29 PROCEDURE — G8753 SYS BP > OR = 140: HCPCS | Performed by: INTERNAL MEDICINE

## 2021-10-29 PROCEDURE — G8400 PT W/DXA NO RESULTS DOC: HCPCS | Performed by: INTERNAL MEDICINE

## 2021-10-29 PROCEDURE — G8420 CALC BMI NORM PARAMETERS: HCPCS | Performed by: INTERNAL MEDICINE

## 2021-10-29 PROCEDURE — G8536 NO DOC ELDER MAL SCRN: HCPCS | Performed by: INTERNAL MEDICINE

## 2021-10-29 PROCEDURE — 99215 OFFICE O/P EST HI 40 MIN: CPT | Performed by: INTERNAL MEDICINE

## 2021-10-29 PROCEDURE — G8427 DOCREV CUR MEDS BY ELIG CLIN: HCPCS | Performed by: INTERNAL MEDICINE

## 2021-10-29 RX ORDER — LOSARTAN POTASSIUM 25 MG/1
25 TABLET ORAL DAILY
Qty: 30 TABLET | Refills: 5 | Status: SHIPPED | OUTPATIENT
Start: 2021-10-29 | End: 2022-01-21 | Stop reason: SDUPTHER

## 2021-10-29 NOTE — PATIENT INSTRUCTIONS
There are no discontinued medications. Learning About Cardioversion  What is cardioversion? Cardioversion is a treatment that helps your heart return to a normal rhythm. It treats problems like atrial fibrillation. It is also sometimes used in emergencies. It can correct a fast heartbeat that causes low blood pressure, chest pain, or heart failure. Cardioversion can be done by using an electric current or medicines. What are the types of cardioversion? There are two types:  · The electrical type uses an electric current. The current enters your body through patches on your chest or back. · The chemical type uses medicines. The medicine is usually put into your arm through a tube called an IV. Your doctor may ask you to take medicines before the treatment. These help prevent blood clots. Electrical cardioversion  The electrical procedure is done in a hospital. You will get medicine to help you relax and control the pain. Your doctor will put patches on your chest or back. The patches send an electric current to your heart. This resets your heart rhythm. The electrical part takes about 5 minutes. But you will probably be in the hospital for 1 to 2 hours. You will need to recover from the effects of the sedative medicine. Chemical cardioversion  The chemical procedure is most often done in a hospital. In most cases, the medicine is put into your arm through a tube called an IV. But you may get medicines to take by mouth. You may feel a quick sting or pinch when the IV starts. The procedure usually takes about 4 to 8 hours. What can you expect after cardioversion? · You can usually go home the same day. You will need someone to drive you home. · Your doctor may have you take medicines daily. These help your heart beat normally and prevent blood clots. · After electrical cardioversion, you may have redness where the patches were. This looks and feels like a sunburn.   · Abnormal heart rhythms sometimes come back after cardioversion. Follow-up care is a key part of your treatment and safety. Be sure to make and go to all appointments, and call your doctor if you are having problems. It's also a good idea to know your test results and keep a list of the medicines you take. Where can you learn more? Go to http://www.gray.com/  Enter [id number] in the search box to learn more about \"Learning About Cardioversion. \"  Current as of: April 29, 2021               Content Version: 13.0  © 7932-5689 [employer]. Care instructions adapted under license by [employer] (which disclaims liability or warranty for this information). If you have questions about a medical condition or this instruction, always ask your healthcare professional. [address] any warranty or liability for your use of this information. 1. You are scheduled to have a Cardioversion on    11/9/21 at 9 am.   Please check in at 8am.     2. Please go to [facility] and park in the outpatient parking lot that is located around to the back of the hospital and enter through the [facility] building. See map for directions. Once you enter through the [facility] check in with the  there. If for some reason, there is not a  available, please use the phone that is there. The  will either give you directions or assist you in getting to the cath holding area. 3.   You are not to eat or drink anything after midnight the morning of the         procedure. If you are scheduled after 2 PM,   You may have a clear liquid breakfast before 8 AM  the morning of  the procedure and then nothing to eat or drink after 8 AM.  Clear liquids include:   Jello, Broth, Apple Juice, Water and Black Coffee. 4. Please continue to take your medications with a small sip of water.       5. If you are diabetic, do not take your insulin/sugar pill the morning of the procedure. 6. We encourage families to wait in the waiting room on the first floor while the procedure is being done. The Doctor will come out and talk with you as soon as the procedure is over. 7. You will need someone to drive you home, so please have someone available. 8. If you or your family have any questions, please call our office Monday- Friday,       9:00 a.m.  4:30 p.m., at [phone], and ask to speak to one of the nurses.

## 2021-10-29 NOTE — H&P (VIEW-ONLY)
HISTORY OF PRESENT ILLNESS  Connie Lester is a 78 y.o. female. Follow-up of paroxysmal atrial flutter fibrillation, status post cardioversion, CHF, MR    8/21 recurrent A. fib with dyspnea worsening for about 4 months. 11/20 complains of dry cough    Palpitations   The history is provided by the patient. This is a new problem. The current episode started more than 1 week ago (7/19; ). The problem has been resolved. The problem occurs every several days. On average, each episode lasts 2 minutes. The problem is associated with nothing (ADLs). Associated symptoms include malaise/fatigue (11/20 improving), dizziness (occasional) and shortness of breath. Pertinent negatives include no diaphoresis, no fever, no chest pain, no claudication, no orthopnea, no PND, no nausea, no vomiting, no headaches and no cough. Shortness of Breath  The history is provided by the patient. This is a new problem. The problem occurs intermittently. The current episode started more than 1 week ago. The problem has not changed since onset. Pertinent negatives include no fever, no headaches, no cough, no wheezing, no PND, no orthopnea, no chest pain, no vomiting, no rash, no leg swelling and no claudication. The problem's precipitants include exercise (Walking within the house; 1/20 steps; 3/21 cleaning a lot; 8/21 steps, yard work; 10/21 in house sometimes). Chest Pain (Angina)   The history is provided by the patient. This is a new problem. The current episode started more than 1 week ago. The problem has been resolved. Duration of episode(s) is 1 minute. The problem occurs rarely (3/month; 8/21 1-2/month; ). The pain is associated with normal activity and rest. The pain is present in the lateral region and left side. The pain is mild. The quality of the pain is described as vice-like. The pain does not radiate. Associated symptoms include dizziness (occasional), malaise/fatigue (11/20 improving), palpitations and shortness of breath. Pertinent negatives include no claudication, no cough, no diaphoresis, no fever, no headaches, no nausea, no orthopnea, no PND and no vomiting. She has tried nothing for the symptoms. Follow-up  Associated symptoms include shortness of breath. Pertinent negatives include no chest pain and no headaches. Review of Systems   Constitutional: Positive for malaise/fatigue (11/20 improving). Negative for chills, diaphoresis, fever and weight loss. HENT: Negative for nosebleeds. Eyes: Negative for discharge. Respiratory: Positive for shortness of breath. Negative for cough and wheezing. Cardiovascular: Positive for palpitations. Negative for chest pain, orthopnea, claudication, leg swelling and PND. Gastrointestinal: Negative for diarrhea, nausea and vomiting. Genitourinary: Negative for dysuria and hematuria. Musculoskeletal: Negative for joint pain. Skin: Negative for rash. Neurological: Positive for dizziness (occasional). Negative for seizures, loss of consciousness and headaches. Endo/Heme/Allergies: Negative for polydipsia. Does not bruise/bleed easily. Psychiatric/Behavioral: Negative for depression and substance abuse. The patient does not have insomnia. Allergies   Allergen Reactions    Lisinopril Cough       Past Medical History:   Diagnosis Date    Essential hypertension 11/23/2020       Family History   Problem Relation Age of Onset    Stroke Brother 47    Heart Attack Neg Hx        Social History     Tobacco Use    Smoking status: Never Smoker    Smokeless tobacco: Never Used   Substance Use Topics    Alcohol use: Not Currently    Drug use: Not Currently        Current Outpatient Medications   Medication Sig    amiodarone (CORDARONE) 200 mg tablet Take 1 Tablet by mouth daily.  levothyroxine (SYNTHROID) 25 mcg tablet Take 1 Tablet by mouth Daily (before breakfast).  apixaban (Eliquis) 5 mg tablet Take 1 Tab by mouth two (2) times a day.     furosemide (LASIX) 20 mg tablet Take 1 Tab by mouth daily as needed (Shortness of breath). Skip if SBP less than 105    levocetirizine (XYZAL) 5 mg tablet Take  by mouth. No current facility-administered medications for this visit. Past Surgical History:   Procedure Laterality Date    HX LAP CHOLECYSTECTOMY      [place] CARDIOVERSION ELECTIVE ARRHYTHMIA EXTERNAL N/A 11/10/2020    EP CARDIOVERSION performed by Nemo Kim MD at [facility] CATH LAB       Visit Vitals  BP (!) 145/87   Pulse 85   Ht 5' 3\" (1.6 m)   Wt 57.5 kg (126 lb 12.8 oz)   BMI 22.46 kg/m²       Diagnostic Studies:  I have reviewed the relevant tests done on the patient and show as follows  EKG tracings reviewed by me today. EKG Results     Procedure 720 Value Units Date/Time    AMB POC EKG ROUTINE W/ 12 LEADS, INTER & REP [[id number]] Resulted: 10/29/21 0848    Order Status: Completed Updated: 10/29/21 0839        XR Results (most recent):  No results found for this or any previous visit. 12/03/19   ECHO ADULT COMPLETE 12/03/2019 12/3/2019    Narrative · Left Ventricle: Normal cavity size and wall thickness. Mild systolic   dysfunction. Estimated left ventricular ejection fraction is 46 - 50%. Abnormal left ventricular wall motion. · Left Atrium: Severely dilated left atrium. · Right Ventricle: Moderately dilated right ventricle. Borderline low   systolic function. · Right Atrium: Moderately dilated right atrium. · Mitral Valve: Mitral valve thickening. Moderate mitral valve   regurgitation is present. · Tricuspid Valve: Mild tricuspid valve regurgitation is present. · Pulmonary Artery: There is no evidence of pulmonary hypertension. · Pulmonic Valve: Mild pulmonic valve regurgitation is present. · IVC/Hepatic Veins: Mildly elevated central venous pressure (5-10 mmHg);   IVC diameter is less than 21 mm and collapses less than 50% with   respiration.         Signed by: Nemo Kim MD     12/03/19   NUCLEAR CARDIAC STRESS TEST 12/03/2019 12/4/2019    Narrative · Gated SPECT: Left ventricular function post-stress was normal.   Calculated ejection fraction is 57%. There is no evidence of transient   ischemic dilation (TID). The TID ratio is 0.8. · Baseline ECG: Atrial fibrillation, non-specific ST-T wave abnormalities. · Negative stress test.  · Left ventricular perfusion is normal.  · Negative myocardial perfusion imaging. Myocardial perfusion imaging   supports a low risk stress test.        Signed by: Thelma Tee MD   10/21 echo  Interpretation Summary    · LV: Calculated LVEF is 55%. Normal cavity size, wall thickness and systolic function (ejection fraction normal). Wall motion: normal. Inconclusive left ventricular diastolic function. · LA: Severely dilated left atrium. Left Atrium volume index is 65.68 mL/m2. · RV: Moderately dilated right ventricle. · RA: Moderately dilated right atrium. · MV: Moderate mitral valve regurgitation is present. · TV: Mild tricuspid valve regurgitation is present. Right Ventricular Arterial Pressure (RVSP) is 27 mmHg. Pulmonary hypertension not suggested by Doppler findings. Comparison Study Information    Prior Study    When compared to the previous study from 12/3/19, the overall left ventricular ejection fraction and wall motion now appear normal.         Ms. Ata Michelle has a reminder for a \"due or due soon\" health maintenance. I have asked that she contact her primary care provider for follow-up on this health maintenance. Physical Exam  Constitutional:       General: She is not in acute distress. Appearance: She is well-developed. HENT:      Head: Normocephalic and atraumatic. Mouth/Throat:      Dentition: Normal dentition. Eyes:      General: No scleral icterus. Right eye: No discharge. Left eye: No discharge. Neck:      Thyroid: No thyromegaly. Vascular: No carotid bruit or JVD. Cardiovascular:      Rate and Rhythm: Normal rate.  Rhythm irregularly irregular. Pulses: Intact distal pulses. Heart sounds: Normal heart sounds, S1 normal and S2 normal. No murmur heard. No friction rub. No gallop. Pulmonary:      Effort: Pulmonary effort is normal.      Breath sounds: Normal breath sounds. No wheezing or rales. Abdominal:      Palpations: Abdomen is soft. There is no mass. Tenderness: There is no abdominal tenderness. Musculoskeletal:      Cervical back: Neck supple. Right lower leg: No edema. Left lower leg: No edema. Lymphadenopathy:      Cervical:      Right cervical: No superficial cervical adenopathy. Left cervical: No superficial cervical adenopathy. Skin:     General: Skin is warm and dry. Findings: No rash. Neurological:      Mental Status: She is alert and oriented to person, place, and time. Psychiatric:         Behavior: Behavior normal.         ASSESSMENT and PLAN    Atrial Fibrillation CHADSVASC2 Score Stroke Risk:   78 y.o. 72 to 76  +1   female Female +1   CHF HX: No    + 0   HTN HX: No    + 0   Stroke/TIA/Thromboembolism No    +0   Vascular Disease HX: No    + 0   Diabetes Mellitus No    + 0   CHADSVASC 2 Score 2      Annual Stroke Risk 2.2%- moderate-high        MR: 12/19 mild to moderate; 10/21 moderate;  Cardiomyopathy: 12/19 45 to 50% EF, dilated LA, RA, RV 10/21  55% EF, dilated other chambers, no pulmonary hypertension;  Cardioversion: Successful 11/20; recurrent A. fib 8/21 with DAVIS;    11/20 status post cardioversion and is doing well. EKG with normal QTC. Tolerating sotalol well. Complains of dry cough and still has mild hypertension. Change lisinopril to losartan. Diet and exercise discussed. CHF is responding to medications fairly well. NYHA class II. Continue present dose of sotalol. Discontinue aspirin and continue Eliquis. 3/21 shortness of breath is overall improving further. Edema is rare. CHF is compensated NYHA class II.   A. fib is staying into sinus bradycardia. Continue Eliquis. Blood pressure is mildly elevated but patient gets mildly dizzy post medications, so I will not increase the medicines any further yet. Cough has improved since lisinopril switch to losartan. Check echo to follow-up on LV function as well as MR. Chest pain is atypical.  Had a negative stress test a year ago. No further work-up. .    8/19/2021 A. fib has recurred. She is feeling more dyspneic. Losartan held as she was feeling sick, likely had low pressure along with sotalol. Explained to try losartan again as sotalol is being discontinued and switched to amiodarone. Labs for amiodarone follow-up as ordered. Discussed with patient the choice between amiodarone and ablation. She would prefer medication first.  She is losing significant weight. Told her to see the PCP but will include basic labs today. Blood pressure to be monitored at home. Diagnoses and all orders for this visit:    1. Paroxysmal atrial fibrillation (HCC)    2. Essential hypertension  -     AMB POC EKG ROUTINE W/ 12 LEADS, INTER & REP  -     losartan (COZAAR) 25 mg tablet; Take 1 Tablet by mouth daily. 3. Chronic combined systolic and diastolic congestive heart failure ([address]    4. Nonrheumatic mitral valve regurgitation        Pertinent laboratory and test data reviewed and discussed with patient. See patient instructions also for other medical advice given    There are no discontinued medications. Follow-up and Dispositions    · Return in about 6 weeks (around 12/10/2021), or if symptoms worsen or fail to improve, for with ekg, post procedure. 10/29/2021 persisting in A. fib despite oral amiodarone at home. Will need a cardioversion and will be scheduled in the next 1 to 2 weeks. Procedure discussed and explained to her again. Blood pressure is mildly elevated. Add low-dose losartan. CHF is compensated NYHA class II.  MR is stable. Follow clinically.

## 2021-10-29 NOTE — PROGRESS NOTES
HISTORY OF PRESENT ILLNESS  Sara Lugo is a 78 y.o. female. Follow-up of paroxysmal atrial flutter fibrillation, status post cardioversion, CHF, MR    8/21 recurrent A. fib with dyspnea worsening for about 4 months. 11/20 complains of dry cough    Palpitations   The history is provided by the patient. This is a new problem. The current episode started more than 1 week ago (7/19; ). The problem has been resolved. The problem occurs every several days. On average, each episode lasts 2 minutes. The problem is associated with nothing (ADLs). Associated symptoms include malaise/fatigue (11/20 improving), dizziness (occasional) and shortness of breath. Pertinent negatives include no diaphoresis, no fever, no chest pain, no claudication, no orthopnea, no PND, no nausea, no vomiting, no headaches and no cough. Shortness of Breath  The history is provided by the patient. This is a new problem. The problem occurs intermittently. The current episode started more than 1 week ago. The problem has not changed since onset. Pertinent negatives include no fever, no headaches, no cough, no wheezing, no PND, no orthopnea, no chest pain, no vomiting, no rash, no leg swelling and no claudication. The problem's precipitants include exercise (Walking within the house; 1/20 steps; 3/21 cleaning a lot; 8/21 steps, yard work; 10/21 in house sometimes). Chest Pain (Angina)   The history is provided by the patient. This is a new problem. The current episode started more than 1 week ago. The problem has been resolved. Duration of episode(s) is 1 minute. The problem occurs rarely (3/month; 8/21 1-2/month; ). The pain is associated with normal activity and rest. The pain is present in the lateral region and left side. The pain is mild. The quality of the pain is described as vice-like. The pain does not radiate. Associated symptoms include dizziness (occasional), malaise/fatigue (11/20 improving), palpitations and shortness of breath. Pertinent negatives include no claudication, no cough, no diaphoresis, no fever, no headaches, no nausea, no orthopnea, no PND and no vomiting. She has tried nothing for the symptoms. Follow-up  Associated symptoms include shortness of breath. Pertinent negatives include no chest pain and no headaches. Review of Systems   Constitutional: Positive for malaise/fatigue (11/20 improving). Negative for chills, diaphoresis, fever and weight loss. HENT: Negative for nosebleeds. Eyes: Negative for discharge. Respiratory: Positive for shortness of breath. Negative for cough and wheezing. Cardiovascular: Positive for palpitations. Negative for chest pain, orthopnea, claudication, leg swelling and PND. Gastrointestinal: Negative for diarrhea, nausea and vomiting. Genitourinary: Negative for dysuria and hematuria. Musculoskeletal: Negative for joint pain. Skin: Negative for rash. Neurological: Positive for dizziness (occasional). Negative for seizures, loss of consciousness and headaches. Endo/Heme/Allergies: Negative for polydipsia. Does not bruise/bleed easily. Psychiatric/Behavioral: Negative for depression and substance abuse. The patient does not have insomnia. Allergies   Allergen Reactions    Lisinopril Cough       Past Medical History:   Diagnosis Date    Essential hypertension 11/23/2020       Family History   Problem Relation Age of Onset    Stroke Brother 47    Heart Attack Neg Hx        Social History     Tobacco Use    Smoking status: Never Smoker    Smokeless tobacco: Never Used   Substance Use Topics    Alcohol use: Not Currently    Drug use: Not Currently        Current Outpatient Medications   Medication Sig    amiodarone (CORDARONE) 200 mg tablet Take 1 Tablet by mouth daily.  levothyroxine (SYNTHROID) 25 mcg tablet Take 1 Tablet by mouth Daily (before breakfast).  apixaban (Eliquis) 5 mg tablet Take 1 Tab by mouth two (2) times a day.     furosemide (LASIX) 20 mg tablet Take 1 Tab by mouth daily as needed (Shortness of breath). Skip if SBP less than 105    levocetirizine (XYZAL) 5 mg tablet Take  by mouth. No current facility-administered medications for this visit. Past Surgical History:   Procedure Laterality Date    HX LAP CHOLECYSTECTOMY      [place] CARDIOVERSION ELECTIVE ARRHYTHMIA EXTERNAL N/A 11/10/2020    EP CARDIOVERSION performed by Ankur Mcdonnell MD at [facility] CATH LAB       Visit Vitals  BP (!) 145/87   Pulse 85   Ht 5' 3\" (1.6 m)   Wt 57.5 kg (126 lb 12.8 oz)   BMI 22.46 kg/m²       Diagnostic Studies:  I have reviewed the relevant tests done on the patient and show as follows  EKG tracings reviewed by me today. EKG Results     Procedure 720 Value Units Date/Time    AMB POC EKG ROUTINE W/ 12 LEADS, INTER & REP [[id number]] Resulted: 10/29/21 0848    Order Status: Completed Updated: 10/29/21 0839        XR Results (most recent):  No results found for this or any previous visit. 12/03/19   ECHO ADULT COMPLETE 12/03/2019 12/3/2019    Narrative · Left Ventricle: Normal cavity size and wall thickness. Mild systolic   dysfunction. Estimated left ventricular ejection fraction is 46 - 50%. Abnormal left ventricular wall motion. · Left Atrium: Severely dilated left atrium. · Right Ventricle: Moderately dilated right ventricle. Borderline low   systolic function. · Right Atrium: Moderately dilated right atrium. · Mitral Valve: Mitral valve thickening. Moderate mitral valve   regurgitation is present. · Tricuspid Valve: Mild tricuspid valve regurgitation is present. · Pulmonary Artery: There is no evidence of pulmonary hypertension. · Pulmonic Valve: Mild pulmonic valve regurgitation is present. · IVC/Hepatic Veins: Mildly elevated central venous pressure (5-10 mmHg);   IVC diameter is less than 21 mm and collapses less than 50% with   respiration.         Signed by: Ankur Mcdonnell MD     12/03/19   NUCLEAR CARDIAC STRESS TEST 12/03/2019 12/4/2019    Narrative · Gated SPECT: Left ventricular function post-stress was normal.   Calculated ejection fraction is 57%. There is no evidence of transient   ischemic dilation (TID). The TID ratio is 0.8. · Baseline ECG: Atrial fibrillation, non-specific ST-T wave abnormalities. · Negative stress test.  · Left ventricular perfusion is normal.  · Negative myocardial perfusion imaging. Myocardial perfusion imaging   supports a low risk stress test.        Signed by: Junaid Arias MD   10/21 echo  Interpretation Summary    · LV: Calculated LVEF is 55%. Normal cavity size, wall thickness and systolic function (ejection fraction normal). Wall motion: normal. Inconclusive left ventricular diastolic function. · LA: Severely dilated left atrium. Left Atrium volume index is 65.68 mL/m2. · RV: Moderately dilated right ventricle. · RA: Moderately dilated right atrium. · MV: Moderate mitral valve regurgitation is present. · TV: Mild tricuspid valve regurgitation is present. Right Ventricular Arterial Pressure (RVSP) is 27 mmHg. Pulmonary hypertension not suggested by Doppler findings. Comparison Study Information    Prior Study    When compared to the previous study from 12/3/19, the overall left ventricular ejection fraction and wall motion now appear normal.         Ms. Flora Segovia has a reminder for a \"due or due soon\" health maintenance. I have asked that she contact her primary care provider for follow-up on this health maintenance. Physical Exam  Constitutional:       General: She is not in acute distress. Appearance: She is well-developed. HENT:      Head: Normocephalic and atraumatic. Mouth/Throat:      Dentition: Normal dentition. Eyes:      General: No scleral icterus. Right eye: No discharge. Left eye: No discharge. Neck:      Thyroid: No thyromegaly. Vascular: No carotid bruit or JVD. Cardiovascular:      Rate and Rhythm: Normal rate.  Rhythm irregularly irregular. Pulses: Intact distal pulses. Heart sounds: Normal heart sounds, S1 normal and S2 normal. No murmur heard. No friction rub. No gallop. Pulmonary:      Effort: Pulmonary effort is normal.      Breath sounds: Normal breath sounds. No wheezing or rales. Abdominal:      Palpations: Abdomen is soft. There is no mass. Tenderness: There is no abdominal tenderness. Musculoskeletal:      Cervical back: Neck supple. Right lower leg: No edema. Left lower leg: No edema. Lymphadenopathy:      Cervical:      Right cervical: No superficial cervical adenopathy. Left cervical: No superficial cervical adenopathy. Skin:     General: Skin is warm and dry. Findings: No rash. Neurological:      Mental Status: She is alert and oriented to person, place, and time. Psychiatric:         Behavior: Behavior normal.         ASSESSMENT and PLAN    Atrial Fibrillation CHADSVASC2 Score Stroke Risk:   78 y.o. 72 to 76  +1   female Female +1   CHF HX: No    + 0   HTN HX: No    + 0   Stroke/TIA/Thromboembolism No    +0   Vascular Disease HX: No    + 0   Diabetes Mellitus No    + 0   CHADSVASC 2 Score 2      Annual Stroke Risk 2.2%- moderate-high        MR: 12/19 mild to moderate; 10/21 moderate;  Cardiomyopathy: 12/19 45 to 50% EF, dilated LA, RA, RV 10/21  55% EF, dilated other chambers, no pulmonary hypertension;  Cardioversion: Successful 11/20; recurrent A. fib 8/21 with [facility]IS;    11/20 status post cardioversion and is doing well. EKG with normal QTC. Tolerating sotalol well. Complains of dry cough and still has mild hypertension. Change lisinopril to losartan. Diet and exercise discussed. CHF is responding to medications fairly well. NYHA class II. Continue present dose of sotalol. Discontinue aspirin and continue Eliquis. 3/21 shortness of breath is overall improving further. Edema is rare. CHF is compensated NYHA class II.   A. fib is staying into sinus bradycardia. Continue Eliquis. Blood pressure is mildly elevated but patient gets mildly dizzy post medications, so I will not increase the medicines any further yet. Cough has improved since lisinopril switch to losartan. Check echo to follow-up on LV function as well as MR. Chest pain is atypical.  Had a negative stress test a year ago. No further work-up. .    8/19/2021 A. fib has recurred. She is feeling more dyspneic. Losartan held as she was feeling sick, likely had low pressure along with sotalol. Explained to try losartan again as sotalol is being discontinued and switched to amiodarone. Labs for amiodarone follow-up as ordered. Discussed with patient the choice between amiodarone and ablation. She would prefer medication first.  She is losing significant weight. Told her to see the PCP but will include basic labs today. Blood pressure to be monitored at home. Diagnoses and all orders for this visit:    1. Paroxysmal atrial fibrillation (HCC)    2. Essential hypertension  -     AMB POC EKG ROUTINE W/ 12 LEADS, INTER & REP  -     losartan (COZAAR) 25 mg tablet; Take 1 Tablet by mouth daily. 3. Chronic combined systolic and diastolic congestive heart failure ([address]    4. Nonrheumatic mitral valve regurgitation        Pertinent laboratory and test data reviewed and discussed with patient. See patient instructions also for other medical advice given    There are no discontinued medications. Follow-up and Dispositions    · Return in about 6 weeks (around 12/10/2021), or if symptoms worsen or fail to improve, for with ekg, post procedure. 10/29/2021 persisting in A. fib despite oral amiodarone at home. Will need a cardioversion and will be scheduled in the next 1 to 2 weeks. Procedure discussed and explained to her again. Blood pressure is mildly elevated. Add low-dose losartan. CHF is compensated NYHA class II.  MR is stable. Follow clinically.

## 2021-10-29 NOTE — PROGRESS NOTES
1. Have you been to the ER, urgent care clinic since your last visit? Hospitalized since your last visit?     no  2. Have you seen or consulted any other health care providers outside of the [address] [place] [address] since your last visit? Include any pap smears or colon screening. No     3. Since your last visit, have you had any of the following symptoms?     no    4. Have you had any blood work, X-rays or cardiac testing? Yes Where: [facility]/ Sept 2021      5. Where do you normally have your labs drawn? [facility]    6. Do you need any refills today?    no

## 2021-11-09 ENCOUNTER — HOSPITAL ENCOUNTER (OUTPATIENT)
Age: 66
Setting detail: OUTPATIENT SURGERY
Discharge: HOME | End: 2021-11-09
Attending: INTERNAL MEDICINE | Admitting: INTERNAL MEDICINE
Payer: MEDICARE

## 2021-11-09 ENCOUNTER — ANESTHESIA EVENT (OUTPATIENT)
Dept: CARDIAC CATH/INVASIVE PROCEDURES | Age: 66
End: 2021-11-09
Payer: MEDICARE

## 2021-11-09 ENCOUNTER — ANESTHESIA (OUTPATIENT)
Dept: CARDIAC CATH/INVASIVE PROCEDURES | Age: 66
End: 2021-11-09
Payer: MEDICARE

## 2021-11-09 VITALS
DIASTOLIC BLOOD PRESSURE: 85 MMHG | TEMPERATURE: 97.1 F | BODY MASS INDEX: 21.66 KG/M2 | OXYGEN SATURATION: 100 % | HEART RATE: 90 BPM | WEIGHT: 130 LBS | RESPIRATION RATE: 31 BRPM | HEIGHT: 65 IN | SYSTOLIC BLOOD PRESSURE: 154 MMHG

## 2021-11-09 DIAGNOSIS — I48.91 AFIB (HCC): ICD-10-CM

## 2021-11-09 LAB
ANION GAP SERPL CALC-SCNC: 3 MMOL/L (ref 3–18)
ATRIAL RATE: 60 BPM
BUN SERPL-MCNC: 17 MG/DL (ref 7–18)
BUN/CREAT SERPL: 17 (ref 12–20)
CALCIUM SERPL-MCNC: 8.6 MG/DL (ref 8.5–10.1)
CALCULATED P AXIS, ECG09: 64 DEGREES
CALCULATED R AXIS, ECG10: 15 DEGREES
CALCULATED T AXIS, ECG11: 65 DEGREES
CHLORIDE SERPL-SCNC: 107 MMOL/L (ref 100–111)
CO2 SERPL-SCNC: 30 MMOL/L (ref 21–32)
COVID-19 RAPID TEST, COVR: NOT DETECTED
CREAT SERPL-MCNC: 1 MG/DL (ref 0.6–1.3)
DIAGNOSIS, 93000: NORMAL
ERYTHROCYTE [DISTWIDTH] IN BLOOD BY AUTOMATED COUNT: 14.7 % (ref 11.6–14.5)
GLUCOSE SERPL-MCNC: 86 MG/DL (ref 74–99)
HCT VFR BLD AUTO: 32.1 % (ref 35–45)
HGB BLD-MCNC: 10.8 G/DL (ref 12–16)
INR PPP: 1.2 (ref 0.8–1.2)
MCH RBC QN AUTO: 36 PG (ref 24–34)
MCHC RBC AUTO-ENTMCNC: 33.6 G/DL (ref 31–37)
MCV RBC AUTO: 107 FL (ref 78–100)
P-R INTERVAL, ECG05: 174 MS
PLATELET # BLD AUTO: 226 K/UL (ref 135–420)
PMV BLD AUTO: 9.6 FL (ref 9.2–11.8)
POTASSIUM SERPL-SCNC: 3.6 MMOL/L (ref 3.5–5.5)
PROTHROMBIN TIME: 15.5 SEC (ref 11.5–15.2)
Q-T INTERVAL, ECG07: 482 MS
QRS DURATION, ECG06: 98 MS
QTC CALCULATION (BEZET), ECG08: 482 MS
RBC # BLD AUTO: 3 M/UL (ref 4.2–5.3)
SODIUM SERPL-SCNC: 140 MMOL/L (ref 136–145)
SOURCE, COVRS: NORMAL
VENTRICULAR RATE, ECG03: 60 BPM
WBC # BLD AUTO: 3.6 K/UL (ref 4.6–13.2)

## 2021-11-09 PROCEDURE — 74011250637 HC RX REV CODE- 250/637: Performed by: INTERNAL MEDICINE

## 2021-11-09 PROCEDURE — 93005 ELECTROCARDIOGRAM TRACING: CPT

## 2021-11-09 PROCEDURE — 76060000031 HC ANESTHESIA FIRST 0.5 HR: Performed by: INTERNAL MEDICINE

## 2021-11-09 PROCEDURE — 99153 MOD SED SAME PHYS/QHP EA: CPT | Performed by: INTERNAL MEDICINE

## 2021-11-09 PROCEDURE — 74011250636 HC RX REV CODE- 250/636: Performed by: NURSE ANESTHETIST, CERTIFIED REGISTERED

## 2021-11-09 PROCEDURE — 80048 BASIC METABOLIC PNL TOTAL CA: CPT

## 2021-11-09 PROCEDURE — 85610 PROTHROMBIN TIME: CPT

## 2021-11-09 PROCEDURE — 00410 ANES INTEG SYS CONV ARRHYT: CPT | Performed by: NURSE ANESTHETIST, CERTIFIED REGISTERED

## 2021-11-09 PROCEDURE — 92960 CARDIOVERSION ELECTRIC EXT: CPT | Performed by: INTERNAL MEDICINE

## 2021-11-09 PROCEDURE — 87635 SARS-COV-2 COVID-19 AMP PRB: CPT

## 2021-11-09 PROCEDURE — 99152 MOD SED SAME PHYS/QHP 5/>YRS: CPT | Performed by: INTERNAL MEDICINE

## 2021-11-09 PROCEDURE — 00410 ANES INTEG SYS CONV ARRHYT: CPT | Performed by: ANESTHESIOLOGY

## 2021-11-09 PROCEDURE — 74011000250 HC RX REV CODE- 250: Performed by: NURSE ANESTHETIST, CERTIFIED REGISTERED

## 2021-11-09 PROCEDURE — 85027 COMPLETE CBC AUTOMATED: CPT

## 2021-11-09 RX ORDER — PROPOFOL 10 MG/ML
INJECTION, EMULSION INTRAVENOUS AS NEEDED
Status: DISCONTINUED | OUTPATIENT
Start: 2021-11-09 | End: 2021-11-09 | Stop reason: HOSPADM

## 2021-11-09 RX ORDER — LIDOCAINE HYDROCHLORIDE 20 MG/ML
INJECTION, SOLUTION EPIDURAL; INFILTRATION; INTRACAUDAL; PERINEURAL AS NEEDED
Status: DISCONTINUED | OUTPATIENT
Start: 2021-11-09 | End: 2021-11-09 | Stop reason: HOSPADM

## 2021-11-09 RX ORDER — SODIUM CHLORIDE 9 MG/ML
INJECTION, SOLUTION INTRAVENOUS
Status: DISCONTINUED | OUTPATIENT
Start: 2021-11-09 | End: 2021-11-09 | Stop reason: HOSPADM

## 2021-11-09 RX ADMIN — LIDOCAINE HYDROCHLORIDE 50 MG: 20 INJECTION, SOLUTION EPIDURAL; INFILTRATION; INTRACAUDAL; PERINEURAL at 10:33

## 2021-11-09 RX ADMIN — APIXABAN 5 MG: 5 TABLET, FILM COATED ORAL at 10:18

## 2021-11-09 RX ADMIN — PROPOFOL 100 MG: 10 INJECTION, EMULSION INTRAVENOUS at 10:33

## 2021-11-09 RX ADMIN — SODIUM CHLORIDE: 900 INJECTION, SOLUTION INTRAVENOUS at 10:25

## 2021-11-09 NOTE — ANESTHESIA POSTPROCEDURE EVALUATION
Procedure(s):  EP CARDIOVERSION. MAC    Anesthesia Post Evaluation      Multimodal analgesia: multimodal analgesia used between 6 hours prior to anesthesia start to PACU discharge  Patient location during evaluation: bedside  Patient participation: complete - patient participated  Level of consciousness: awake  Pain management: adequate  Airway patency: patent  Anesthetic complications: no  Cardiovascular status: stable  Respiratory status: acceptable  Hydration status: acceptable  Post anesthesia nausea and vomiting:  controlled      INITIAL Post-op Vital signs:   Vitals Value Taken Time   /92 11/09/21 1143   Temp     Pulse 84 11/09/21 1150   Resp 18 11/09/21 1150   SpO2 98 % 11/09/21 1150   Vitals shown include unvalidated device data.

## 2021-11-09 NOTE — ROUTINE PROCESS
26: Cath holding summary     Patient escorted to cath holding from waiting area ambulatory, alert and oriented x 4, voicing no complaints. Changed into gown and placed on monitor. NPO since MN. Lab results, med rec and H&P reviewed on chart. PIV x 1 inserted without difficulty. 1230: AVS Discharge instructions reviewed with patient and copy given to patient. All questions answered. Patient verbalized understanding to all discharge instructions. PIV removed. Procedural site within normal limits. No hematoma or bleeding noted from procedural and PIV site. No pain noted at discharge. Patient discharged with support person in stable condition. Escorted out to vehicle for transport home.

## 2021-11-09 NOTE — ANESTHESIA PREPROCEDURE EVALUATION
Relevant Problems   No relevant active problems       Anesthetic History   No history of anesthetic complications            Review of Systems / Medical History  Patient summary reviewed and pertinent labs reviewed    Pulmonary  Within defined limits                 Neuro/Psych   Within defined limits           Cardiovascular    Hypertension: well controlled  Valvular problems/murmurs: mitral insufficiency      Dysrhythmias : atrial fibrillation      Exercise tolerance: <4 METS  Comments: Atrial fibrillation (HCC)  Acute on chronic combined systolic and diastolic congestive heart failure (HCC)  Nonrheumatic mitral valve regurgitation  Paroxysmal atrial fibrillation ([address]  Other problems (0)     GI/Hepatic/Renal  Within defined limits              Endo/Other  Within defined limits           Other Findings   Comments: Echo:    Normal cavity size and wall thickness. Mild systolic dysfunction. The estimated ejection fraction is 46 - 50%. Abnormal wall motion as described on the wall scoring diagram below. Unable to assess diastolic function.  Atrial fibrillation observed           Physical Exam    Airway  Mallampati: II  TM Distance: 4 - 6 cm  Neck ROM: normal range of motion   Mouth opening: Normal     Cardiovascular  Regular rate and rhythm,  S1 and S2 normal,  no murmur, click, rub, or gallop  Rhythm: regular  Rate: normal         Dental  No notable dental hx       Pulmonary  Breath sounds clear to auscultation               Abdominal  GI exam deferred       Other Findings            Anesthetic Plan    ASA: 3  Anesthesia type: MAC          Induction: Intravenous  Anesthetic plan and risks discussed with: Patient

## 2021-11-09 NOTE — INTERVAL H&P NOTE
Update History & Physical    The Patient's History and Physical of October 29,   Cardioversion was reviewed with the patient and I examined the patient. There was no change. The surgical site was confirmed by the patient and me. Plan:  The risk, benefits, expected outcome, and alternative to the recommended procedure have been discussed with the patient. Patient understands and wants to proceed with the procedure.     Electronically signed by Kika Lopez MD on 11/9/2021 at 9:41 AM

## 2021-11-29 DIAGNOSIS — I48.0 PAROXYSMAL ATRIAL FIBRILLATION (HCC): ICD-10-CM

## 2021-11-29 DIAGNOSIS — I50.42 CHRONIC COMBINED SYSTOLIC AND DIASTOLIC CONGESTIVE HEART FAILURE (HCC): ICD-10-CM

## 2021-11-29 RX ORDER — AMIODARONE HYDROCHLORIDE 200 MG/1
200 TABLET ORAL DAILY
Qty: 90 TABLET | Refills: 3 | Status: SHIPPED | OUTPATIENT
Start: 2021-11-29 | End: 2022-01-21

## 2021-11-29 RX ORDER — FUROSEMIDE 20 MG/1
20 TABLET ORAL
Qty: 90 TABLET | Refills: 3 | Status: SHIPPED | OUTPATIENT
Start: 2021-11-29

## 2022-01-12 DIAGNOSIS — I10 ESSENTIAL HYPERTENSION: ICD-10-CM

## 2022-01-12 RX ORDER — LEVOTHYROXINE SODIUM 25 UG/1
25 TABLET ORAL
Qty: 90 TABLET | Refills: 3 | Status: SHIPPED | OUTPATIENT
Start: 2022-01-12 | End: 2022-06-06 | Stop reason: ALTCHOICE

## 2022-01-12 NOTE — TELEPHONE ENCOUNTER
Requested Prescriptions     Pending Prescriptions Disp Refills    levothyroxine (SYNTHROID) 25 mcg tablet 90 Tablet 3     Sig: Take 1 Tablet by mouth Daily (before breakfast).

## 2022-01-14 DIAGNOSIS — I48.0 PAROXYSMAL ATRIAL FIBRILLATION (HCC): ICD-10-CM

## 2022-01-18 RX ORDER — APIXABAN 5 MG/1
TABLET, FILM COATED ORAL
Qty: 60 TABLET | Refills: 6 | Status: SHIPPED | OUTPATIENT
Start: 2022-01-18 | End: 2022-09-23

## 2022-01-21 ENCOUNTER — OFFICE VISIT (OUTPATIENT)
Dept: CARDIOLOGY CLINIC | Age: 67
End: 2022-01-21
Payer: MEDICARE

## 2022-01-21 VITALS
SYSTOLIC BLOOD PRESSURE: 123 MMHG | HEIGHT: 65 IN | BODY MASS INDEX: 21.16 KG/M2 | DIASTOLIC BLOOD PRESSURE: 77 MMHG | HEART RATE: 90 BPM | WEIGHT: 127 LBS

## 2022-01-21 DIAGNOSIS — I34.0 NONRHEUMATIC MITRAL VALVE REGURGITATION: ICD-10-CM

## 2022-01-21 DIAGNOSIS — I48.0 PAROXYSMAL ATRIAL FIBRILLATION (HCC): Primary | ICD-10-CM

## 2022-01-21 DIAGNOSIS — I10 ESSENTIAL HYPERTENSION: ICD-10-CM

## 2022-01-21 DIAGNOSIS — I50.42 CHRONIC COMBINED SYSTOLIC AND DIASTOLIC CONGESTIVE HEART FAILURE (HCC): ICD-10-CM

## 2022-01-21 PROCEDURE — 3017F COLORECTAL CA SCREEN DOC REV: CPT | Performed by: INTERNAL MEDICINE

## 2022-01-21 PROCEDURE — G8400 PT W/DXA NO RESULTS DOC: HCPCS | Performed by: INTERNAL MEDICINE

## 2022-01-21 PROCEDURE — G8752 SYS BP LESS 140: HCPCS | Performed by: INTERNAL MEDICINE

## 2022-01-21 PROCEDURE — 93000 ELECTROCARDIOGRAM COMPLETE: CPT | Performed by: INTERNAL MEDICINE

## 2022-01-21 PROCEDURE — G8420 CALC BMI NORM PARAMETERS: HCPCS | Performed by: INTERNAL MEDICINE

## 2022-01-21 PROCEDURE — G8427 DOCREV CUR MEDS BY ELIG CLIN: HCPCS | Performed by: INTERNAL MEDICINE

## 2022-01-21 PROCEDURE — G8536 NO DOC ELDER MAL SCRN: HCPCS | Performed by: INTERNAL MEDICINE

## 2022-01-21 PROCEDURE — 99214 OFFICE O/P EST MOD 30 MIN: CPT | Performed by: INTERNAL MEDICINE

## 2022-01-21 PROCEDURE — G8754 DIAS BP LESS 90: HCPCS | Performed by: INTERNAL MEDICINE

## 2022-01-21 PROCEDURE — 1101F PT FALLS ASSESS-DOCD LE1/YR: CPT | Performed by: INTERNAL MEDICINE

## 2022-01-21 PROCEDURE — G8432 DEP SCR NOT DOC, RNG: HCPCS | Performed by: INTERNAL MEDICINE

## 2022-01-21 PROCEDURE — 1090F PRES/ABSN URINE INCON ASSESS: CPT | Performed by: INTERNAL MEDICINE

## 2022-01-21 RX ORDER — ERGOCALCIFEROL 1.25 MG/1
50000 CAPSULE ORAL
COMMUNITY

## 2022-01-21 RX ORDER — LOSARTAN POTASSIUM 25 MG/1
25 TABLET ORAL DAILY
Qty: 90 TABLET | Refills: 1 | Status: SHIPPED | OUTPATIENT
Start: 2022-01-21

## 2022-01-21 NOTE — PROGRESS NOTES
HISTORY OF PRESENT ILLNESS  Zonia Mtz is a 78 y.o. female. Follow-up of paroxysmal atrial flutter fibrillation, status post cardioversion, CHF, MR    8/21 recurrent A. fib with dyspnea worsening for about 4 months. 11/20 complains of dry cough    Follow-up  Associated symptoms include shortness of breath. Pertinent negatives include no chest pain and no headaches. Palpitations   The history is provided by the patient (Racing of heartbeat, thumps). This is a new problem. The current episode started more than 1 week ago (7/19; ). The problem has been resolved. The problem occurs every several days. On average, each episode lasts 2 minutes. The problem is associated with nothing (ADLs). Associated symptoms include malaise/fatigue (11/20 improving), dizziness (occasional) and shortness of breath. Pertinent negatives include no diaphoresis, no fever, no chest pain, no claudication, no orthopnea, no PND, no nausea, no vomiting, no headaches and no cough. Shortness of Breath  The history is provided by the patient. This is a new problem. The problem occurs intermittently. The current episode started more than 1 week ago. The problem has not changed since onset. Pertinent negatives include no fever, no headaches, no cough, no wheezing, no PND, no orthopnea, no chest pain, no vomiting, no rash, no leg swelling and no claudication. The problem's precipitants include exercise (Walking within the house; 1/20 steps; 3/21 cleaning a lot; 8/21 steps, yard work; 10/21 in house sometimes). Review of Systems   Constitutional: Positive for malaise/fatigue (11/20 improving). Negative for chills, diaphoresis, fever and weight loss. HENT: Negative for nosebleeds. Eyes: Negative for discharge. Respiratory: Positive for shortness of breath. Negative for cough and wheezing. Cardiovascular: Positive for palpitations. Negative for chest pain, orthopnea, claudication, leg swelling and PND.    Gastrointestinal: Negative for diarrhea, nausea and vomiting. Genitourinary: Negative for dysuria and hematuria. Musculoskeletal: Negative for joint pain. Skin: Negative for rash. Neurological: Positive for dizziness (occasional). Negative for seizures, loss of consciousness and headaches. Endo/Heme/Allergies: Negative for polydipsia. Does not bruise/bleed easily. Psychiatric/Behavioral: Negative for depression and substance abuse. The patient does not have insomnia. Allergies   Allergen Reactions    Lisinopril Cough       Past Medical History:   Diagnosis Date    Essential hypertension 11/23/2020       Family History   Problem Relation Age of Onset    Stroke Brother 47    Heart Attack Neg Hx        Social History     Tobacco Use    Smoking status: Never Smoker    Smokeless tobacco: Never Used   Substance Use Topics    Alcohol use: Not Currently    Drug use: Not Currently        Current Outpatient Medications   Medication Sig    ergocalciferol (ERGOCALCIFEROL) 1,250 mcg (50,000 unit) capsule Take 50,000 Units by mouth every seven (7) days.  Eliquis 5 mg tablet take 1 tablet by mouth twice a day    levothyroxine (SYNTHROID) 25 mcg tablet Take 1 Tablet by mouth Daily (before breakfast).  amiodarone (CORDARONE) 200 mg tablet Take 1 Tablet by mouth daily.  furosemide (LASIX) 20 mg tablet Take 1 Tablet by mouth daily as needed (Shortness of breath). Skip if SBP less than 105    losartan (COZAAR) 25 mg tablet Take 1 Tablet by mouth daily.  levocetirizine (XYZAL) 5 mg tablet Take  by mouth. No current facility-administered medications for this visit.         Past Surgical History:   Procedure Laterality Date    HX LAP CHOLECYSTECTOMY      MT CARDIOVERSION ELECTIVE ARRHYTHMIA EXTERNAL N/A 11/10/2020    EP CARDIOVERSION performed by Donovan Higginbotham MD at [facility] CATH LAB       Visit Vitals  /77 (BP 1 Location: Left upper arm, BP Patient Position: Sitting, BP Cuff Size: Adult)   Pulse 90   Ht 5' 5\" (1.651 m)   Wt 57.6 kg (127 lb)   BMI 21.13 kg/m²       Diagnostic Studies:  I have reviewed the relevant tests done on the patient and show as follows  EKG tracings reviewed by me today. EKG Results     Procedure 720 Value Units Date/Time    AMB POC EKG ROUTINE W/ 12 LEADS, INTER & REP [[id number]] Resulted: 01/21/22 0943    Order Status: Completed Updated: 01/21/22 0939        XR Results (most recent):  No results found for this or any previous visit. 12/03/19   ECHO ADULT COMPLETE 12/03/2019 12/3/2019    Narrative · Left Ventricle: Normal cavity size and wall thickness. Mild systolic   dysfunction. Estimated left ventricular ejection fraction is 46 - 50%. Abnormal left ventricular wall motion. · Left Atrium: Severely dilated left atrium. · Right Ventricle: Moderately dilated right ventricle. Borderline low   systolic function. · Right Atrium: Moderately dilated right atrium. · Mitral Valve: Mitral valve thickening. Moderate mitral valve   regurgitation is present. · Tricuspid Valve: Mild tricuspid valve regurgitation is present. · Pulmonary Artery: There is no evidence of pulmonary hypertension. · Pulmonic Valve: Mild pulmonic valve regurgitation is present. · IVC/Hepatic Veins: Mildly elevated central venous pressure (5-10 mmHg);   IVC diameter is less than 21 mm and collapses less than 50% with   respiration. Signed by: Eileen Rosas MD     12/03/19   NUCLEAR CARDIAC STRESS TEST 12/03/2019 12/4/2019    Narrative · Gated SPECT: Left ventricular function post-stress was normal.   Calculated ejection fraction is 57%. There is no evidence of transient   ischemic dilation (TID). The TID ratio is 0.8. · Baseline ECG: Atrial fibrillation, non-specific ST-T wave abnormalities. · Negative stress test.  · Left ventricular perfusion is normal.  · Negative myocardial perfusion imaging.  Myocardial perfusion imaging   supports a low risk stress test.        Signed by: Hafsa Ybarra MD 10/21 echo  Interpretation Summary    · LV: Calculated LVEF is 55%. Normal cavity size, wall thickness and systolic function (ejection fraction normal). Wall motion: normal. Inconclusive left ventricular diastolic function. · LA: Severely dilated left atrium. Left Atrium volume index is 65.68 mL/m2. · RV: Moderately dilated right ventricle. · RA: Moderately dilated right atrium. · MV: Moderate mitral valve regurgitation is present. · TV: Mild tricuspid valve regurgitation is present. Right Ventricular Arterial Pressure (RVSP) is 27 mmHg. Pulmonary hypertension not suggested by Doppler findings. Comparison Study Information    Prior Study    When compared to the previous study from 12/3/19, the overall left ventricular ejection fraction and wall motion now appear normal.         Ms. Janna Claros has a reminder for a \"due or due soon\" health maintenance. I have asked that she contact her primary care provider for follow-up on this health maintenance. Physical Exam  Constitutional:       General: She is not in acute distress. Appearance: She is well-developed. HENT:      Head: Normocephalic and atraumatic. Mouth/Throat:      Dentition: Normal dentition. Eyes:      General: No scleral icterus. Right eye: No discharge. Left eye: No discharge. Neck:      Thyroid: No thyromegaly. Vascular: No carotid bruit or JVD. Cardiovascular:      Rate and Rhythm: Normal rate. Rhythm irregularly irregular. Pulses: Intact distal pulses. Heart sounds: Normal heart sounds, S1 normal and S2 normal. No murmur heard. No friction rub. No gallop. Pulmonary:      Effort: Pulmonary effort is normal.      Breath sounds: Normal breath sounds. No wheezing or rales. Abdominal:      Palpations: Abdomen is soft. There is no mass. Tenderness: There is no abdominal tenderness. Musculoskeletal:      Cervical back: Neck supple. Right lower leg: No edema.       Left lower leg: No edema.   Lymphadenopathy:      Cervical:      Right cervical: No superficial cervical adenopathy. Left cervical: No superficial cervical adenopathy. Skin:     General: Skin is warm and dry. Findings: No rash. Neurological:      Mental Status: She is alert and oriented to person, place, and time. Psychiatric:         Behavior: Behavior normal.         ASSESSMENT and PLAN    Atrial Fibrillation CHADSVASC2 Score Stroke Risk:   78 y.o. 72 to 76  +1   female Female +1   CHF HX: No    + 0   HTN HX: No    + 0   Stroke/TIA/Thromboembolism No    +0   Vascular Disease HX: No    + 0   Diabetes Mellitus No    + 0   CHADSVASC 2 Score 2      Annual Stroke Risk 2.2%- moderate-high        MR: 12/19 mild to moderate; 10/21 moderate;  Cardiomyopathy: 12/19 45 to 50% EF, dilated LA, RA, RV 10/21  55% EF, dilated other chambers, no pulmonary hypertension;  Cardioversion: Successful 11/20; recurrent A. fib 8/21 with DAVIS;    11/20 status post cardioversion and is doing well. EKG with normal QTC. Tolerating sotalol well. Complains of dry cough and still has mild hypertension. Change lisinopril to losartan. Diet and exercise discussed. CHF is responding to medications fairly well. NYHA class II. Continue present dose of sotalol. Discontinue aspirin and continue Eliquis. 3/21 shortness of breath is overall improving further. Edema is rare. CHF is compensated NYHA class II. A. fib is staying into sinus bradycardia. Continue Eliquis. Blood pressure is mildly elevated but patient gets mildly dizzy post medications, so I will not increase the medicines any further yet. Cough has improved since lisinopril switch to losartan. Check echo to follow-up on LV function as well as MR. Chest pain is atypical.  Had a negative stress test a year ago. No further work-up. .    8/19/2021 A. fib has recurred. She is feeling more dyspneic.   Losartan held as she was feeling sick, likely had low pressure along with sotalol. Explained to try losartan again as sotalol is being discontinued and switched to amiodarone. Labs for amiodarone follow-up as ordered. Discussed with patient the choice between amiodarone and ablation. She would prefer medication first.  She is losing significant weight. Told her to see the PCP but will include basic labs today. Blood pressure to be monitored at home. 10/29/2021 persisting in A. fib despite oral amiodarone at home. Will need a cardioversion and will be scheduled in the next 1 to 2 weeks. Procedure discussed and explained to her again. Blood pressure is mildly elevated. Add low-dose losartan. CHF is compensated NYHA class II.  MR is stable. Follow clinically. Diagnoses and all orders for this visit:    1. Paroxysmal atrial fibrillation (HCC)  -     AMB POC EKG ROUTINE W/ 12 LEADS, INTER & REP  -     TSH 3RD GENERATION; Future    2. Essential hypertension  -     losartan (COZAAR) 25 mg tablet; Take 1 Tablet by mouth daily. 3. Chronic combined systolic and diastolic congestive heart failure ([address]    4. Nonrheumatic mitral valve regurgitation        Pertinent laboratory and test data reviewed and discussed with patient. See patient instructions also for other medical advice given    Medications Discontinued During This Encounter   Medication Reason    amiodarone (CORDARONE) 200 mg tablet Other       Follow-up and Dispositions    · Return in about 3 months (around 4/21/2022), or if symptoms worsen or fail to improve, for post test.       1/21/2022 follow-up after cardioversion. Unfortunately, patient has gone back into A. fib. This was explained to patient. Ablation procedure discussed and offered but she wants to wait on that. She also has moderate MR and continues to have CHF symptoms NYHA class II.   Would recommend a AMRITA to better evaluate the mitral regurgitation and then consider appropriate procedure but she wants to think about it and will call us back when she is ready. Ejection fraction has improved on medications which will be continued.

## 2022-01-21 NOTE — PROGRESS NOTES
1. Have you been to the ER, urgent care clinic since your last visit? Hospitalized since your last visit? No    2. Have you seen or consulted any other health care providers outside of the [address] [place] [address] since your last visit? Include any pap smears or colon screening. No     3. Since your last visit, have you had any of the following symptoms? chest pains, palpitations, shortness of breath and dizziness. 4.  Have you had any blood work, X-rays or cardiac testing? No     Requested: NO     In [facility]: NO    5. Where do you normally have your labs drawn? [facility]    6. Do you need any refills today?    No

## 2022-01-21 NOTE — PATIENT INSTRUCTIONS
Medications Discontinued During This Encounter   Medication Reason    amiodarone (CORDARONE) 200 mg tablet Other            Transesophageal Echocardiogram: Before Your Procedure  What is a transesophageal echocardiogram (AMRITA)? A transesophageal echocardiogram is a test to help your doctor look at the inside of your heart. A small device called a transducer directs sound waves toward your heart. The sound waves make a picture of the heart's valves and chambers. Your doctor may do this test to look for certain types of heart disease. Or it may be done to see how disease is affecting your heart. You will be given medicine to make you sleepy and comfortable during the test.  The doctor puts a small, flexible tube into your throat and guides it to the esophagus. This is the tube that connects your mouth to your stomach. The doctor will ask you to swallow as the tube goes down. The transducer is at the tip of the tube. It gets close to your heart to make clear pictures. The doctor will look at the ultrasound pictures on a screen. You will not be able to eat or drink until the numbness from the throat spray wears off. Your throat may be sore for a few days after the test.  Follow-up care is a key part of your treatment and safety. Be sure to make and go to all appointments, and call your doctor if you are having problems. It's also a good idea to know your test results and keep a list of the medicines you take. How do you prepare for the procedure? Procedures can be stressful. This information will help you understand what you can expect. And it will help you safely prepare for your procedure. Preparing for the procedure    · Follow your doctor's instructions exactly about when to stop eating and drinking before the test. This may be about 8 hours.     · Be sure to tell your doctor about any problems you have with your stomach or esophagus.     · Be sure you have someone to take you home.  Anesthesia and pain medicine will make it unsafe for you to drive or get home on your own.     · Understand exactly what procedure is planned, along with the risks, benefits, and other options. · Tell your doctor ALL the medicines, vitamins, supplements, and herbal remedies you take. Some may increase the risk of problems during your procedure. Your doctor will tell you if you should stop taking any of them before the procedure and how soon to do it.     · If you take aspirin or some other blood thinner, ask your doctor if you should stop taking it before your procedure. Make sure that you understand exactly what your doctor wants you to do. These medicines increase the risk of bleeding.     · Make sure your doctor and the hospital have a copy of your advance directive. If you don't have one, you may want to prepare one. It lets others know your health care wishes. It's a good thing to have before any type of surgery or procedure. What happens on the day of the procedure? · Follow the instructions exactly about when to stop eating and drinking. If you don't, your procedure may be canceled. If your doctor told you to take your medicines on the day of the procedure, take them with only a sip of water.     · Take a bath or shower before you come in for your procedure. Do not apply lotions, perfumes, deodorants, or nail polish.     · Take off all jewelry and piercings. And take out contact lenses, if you wear them.     · If you have dentures or dental prostheses, you may need to remove them before the test.   At the hospital or surgery center   · Bring a picture ID.     · You will be kept comfortable and safe by your anesthesia provider. You may get medicine that relaxes you or puts you in a light sleep.     · You will get some medicine sprayed in your throat. This will numb your throat to prevent you from gagging when the transducer is moved into your esophagus.     · You will lie on your left side on an exam table.  You may get a mouth guard to protect your teeth.     · The procedure may take about 30 to 60 minutes. During that time, the tube may be in your throat for 10 to 20 minutes. When should you call your doctor? · You have questions or concerns.     · You don't understand how to prepare for your procedure.     · You become ill before the procedure (such as fever, flu, or a cold).     · You need to reschedule or have changed your mind about having the procedure. Current as of: April 29, 2021               Content Version: 13.0  © 2006-2021 [employer]. Care instructions adapted under license by [employer] (which disclaims liability or warranty for this information). If you have questions about a medical condition or this instruction, always ask your healthcare professional. [address] any warranty or liability for your use of this information.

## 2022-03-15 ENCOUNTER — OFFICE VISIT (OUTPATIENT)
Dept: CARDIOLOGY CLINIC | Age: 67
End: 2022-03-15
Payer: MEDICARE

## 2022-03-15 VITALS
HEART RATE: 136 BPM | DIASTOLIC BLOOD PRESSURE: 78 MMHG | WEIGHT: 135 LBS | SYSTOLIC BLOOD PRESSURE: 120 MMHG | HEIGHT: 65 IN | OXYGEN SATURATION: 98 % | BODY MASS INDEX: 22.49 KG/M2

## 2022-03-15 DIAGNOSIS — I10 ESSENTIAL HYPERTENSION: ICD-10-CM

## 2022-03-15 DIAGNOSIS — I34.0 NONRHEUMATIC MITRAL VALVE REGURGITATION: ICD-10-CM

## 2022-03-15 DIAGNOSIS — I50.42 CHRONIC COMBINED SYSTOLIC AND DIASTOLIC CONGESTIVE HEART FAILURE (HCC): ICD-10-CM

## 2022-03-15 DIAGNOSIS — I48.0 PAROXYSMAL ATRIAL FIBRILLATION (HCC): Primary | ICD-10-CM

## 2022-03-15 DIAGNOSIS — R06.02 SHORTNESS OF BREATH: ICD-10-CM

## 2022-03-15 PROCEDURE — 1090F PRES/ABSN URINE INCON ASSESS: CPT | Performed by: NURSE PRACTITIONER

## 2022-03-15 PROCEDURE — G8420 CALC BMI NORM PARAMETERS: HCPCS | Performed by: NURSE PRACTITIONER

## 2022-03-15 PROCEDURE — 99214 OFFICE O/P EST MOD 30 MIN: CPT | Performed by: NURSE PRACTITIONER

## 2022-03-15 PROCEDURE — G8752 SYS BP LESS 140: HCPCS | Performed by: NURSE PRACTITIONER

## 2022-03-15 PROCEDURE — G8536 NO DOC ELDER MAL SCRN: HCPCS | Performed by: NURSE PRACTITIONER

## 2022-03-15 PROCEDURE — 3017F COLORECTAL CA SCREEN DOC REV: CPT | Performed by: NURSE PRACTITIONER

## 2022-03-15 PROCEDURE — G8400 PT W/DXA NO RESULTS DOC: HCPCS | Performed by: NURSE PRACTITIONER

## 2022-03-15 PROCEDURE — G8432 DEP SCR NOT DOC, RNG: HCPCS | Performed by: NURSE PRACTITIONER

## 2022-03-15 PROCEDURE — G8754 DIAS BP LESS 90: HCPCS | Performed by: NURSE PRACTITIONER

## 2022-03-15 PROCEDURE — G8427 DOCREV CUR MEDS BY ELIG CLIN: HCPCS | Performed by: NURSE PRACTITIONER

## 2022-03-15 PROCEDURE — 1101F PT FALLS ASSESS-DOCD LE1/YR: CPT | Performed by: NURSE PRACTITIONER

## 2022-03-15 RX ORDER — METOPROLOL TARTRATE 25 MG/1
25 TABLET, FILM COATED ORAL 2 TIMES DAILY
Qty: 60 TABLET | Refills: 1 | Status: SHIPPED | OUTPATIENT
Start: 2022-03-15

## 2022-03-15 NOTE — PROGRESS NOTES
1. Have you been to the ER, urgent care clinic since your last visit? Hospitalized since your last visit?     no    2. Have you seen or consulted any other health care providers outside of the [address] [place] [address] since your last visit? Include any pap smears or colon screening.     Yes pcp

## 2022-03-15 NOTE — PATIENT INSTRUCTIONS
After the recommended changes have been made in blood pressure medicines, patient advised to keep BP/HR(pulse rate) chart twice daily and bring us results in next 4 to 5 days. Patient may send the results via \"My Chart\" if desired. Please rest for 5-10 minutes before checking blood pressure. Sit on a comfortable chair without crossing the legs and put your arm on a table. We recommend that you use an upper arm cuff. Check the blood pressure 3 times each time you check the blood pressure and record the lowest reading. If you check the blood pressure in both arms, use the higher reading. Send to office on Friday         Atrial Fibrillation: Care Instructions  Your Care Instructions     Atrial fibrillation is an irregular and often fast heartbeat. Treating this condition is important for several reasons. It can cause blood clots, which can travel from your heart to your brain and cause a stroke. If you have a fast heartbeat, you may feel lightheaded, dizzy, and weak. An irregular heartbeat can also increase your risk for heart failure. Atrial fibrillation is often the result of another heart condition, such as high blood pressure or coronary artery disease. Making changes to improve your heart condition will help you stay healthy and active. Follow-up care is a key part of your treatment and safety. Be sure to make and go to all appointments, and call your doctor if you are having problems. It's also a good idea to know your test results and keep a list of the medicines you take. How can you care for yourself at home? Medicines    · Take your medicines exactly as prescribed. Call your doctor if you think you are having a problem with your medicine. You will get more details on the specific medicines your doctor prescribes.     · If your doctor has given you a blood thinner to prevent a stroke, be sure you get instructions about how to take your medicine safely.  Blood thinners can cause serious bleeding problems.     · Do not take any vitamins, over-the-counter drugs, or herbal products without talking to your doctor first.   Lifestyle changes    · Do not smoke. Smoking can increase your chance of a stroke and heart attack. If you need help quitting, talk to your doctor about stop-smoking programs and medicines. These can increase your chances of quitting for good.     · Eat a heart-healthy diet.     · Stay at a healthy weight. Lose weight if you need to.     · Limit alcohol to 2 drinks a day for men and 1 drink a day for women. Too much alcohol can cause health problems.     · Avoid colds and flu. Get a pneumococcal vaccine shot. If you have had one before, ask your doctor whether you need another dose. Get a flu shot every year. If you must be around people with colds or flu, wash your hands often. Activity    · If your doctor recommends it, get more exercise. Walking is a good choice. Bit by bit, increase the amount you walk every day. Try for at least 30 minutes on most days of the week. You also may want to swim, bike, or do other activities. Your doctor may suggest that you join a cardiac rehabilitation program so that you can have help increasing your physical activity safely.     · Start light exercise if your doctor says it is okay. Even a small amount will help you get stronger, have more energy, and manage stress. Walking is an easy way to get exercise. Start out by walking a little more than you did in the hospital. Gradually increase the amount you walk.     · When you exercise, watch for signs that your heart is working too hard. You are pushing too hard if you cannot talk while you are exercising. If you become short of breath or dizzy or have chest pain, sit down and rest immediately.     · Check your pulse regularly. Place two fingers on the artery at the palm side of your wrist, in line with your thumb. If your heartbeat seems uneven or fast, talk to your doctor.    When should you call for help?   Call 911 anytime you think you may need emergency care. For example, call if:    · You have symptoms of a heart attack. These may include:  ? Chest pain or pressure, or a strange feeling in the chest.  ? Sweating. ? Shortness of breath. ? Nausea or vomiting. ? Pain, pressure, or a strange feeling in the back, neck, jaw, or upper belly or in one or both shoulders or arms. ? Lightheadedness or sudden weakness. ? A fast or irregular heartbeat. After you call 911, the  may tell you to chew 1 adult-strength or 2 to 4 low-dose aspirin. Wait for an ambulance. Do not try to drive yourself.     · You have symptoms of a stroke. These may include:  ? Sudden numbness, tingling, weakness, or loss of movement in your face, arm, or leg, especially on only one side of your body. ? Sudden vision changes. ? Sudden trouble speaking. ? Sudden confusion or trouble understanding simple statements. ? Sudden problems with walking or balance. ? A sudden, severe headache that is different from past headaches.     · You passed out (lost consciousness). Call your doctor now or seek immediate medical care if:    · You have new or increased shortness of breath.     · You feel dizzy or lightheaded, or you feel like you may faint.     · Your heart rate becomes irregular.     · You can feel your heart flutter in your chest or skip heartbeats. Tell your doctor if these symptoms are new or worse. Watch closely for changes in your health, and be sure to contact your doctor if you have any problems. Where can you learn more? Go to http://www.gray.com/  Enter U020 in the search box to learn more about \"Atrial Fibrillation: Care Instructions. \"  Current as of: January 10, 2022               Content Version: 13.2  © 4458-0701 [employer]. Care instructions adapted under license by [employer] (which disclaims liability or warranty for this information).  If you have questions about a medical condition or this instruction, always ask your healthcare professional. [address] any warranty or liability for your use of this information. [address] [facility]     1. You are scheduled to have a AMRITA  on    3/29/22 at     . Please check in 1.5 hours prior to Test .     2. Please go to [facility] and jazmin in the outpatient parking lot that is located around to the back of the hospital and enter through the [facility] building. See map for directions. Once you enter through the [facility] check in with the  there. If for some reason, there is not a  available, please use the phone that is there. The  will either give you directions or assist you in getting to the cath holding area. 3.   You are not to eat or drink anything after midnight the morning of the procedure. If you are scheduled after 2 PM,   You may have a clear liquid breakfast before 8 AM  the morning of  the procedure and then nothing to eat or drink after 8 AM.  Clear liquids include:   Jello, Broth, Apple Juice, Water and Black Coffee. 4. Please continue to take your medications with a small sip of water. 5. If you are diabetic, do not take your insulin/sugar pill the morning of the procedure. 6. We encourage families to wait in the waiting room on the first floor while the procedure is being done. The Doctor will come out and talk with you as soon as the procedure is over. 7. You will need someone to drive you home, so please have someone available. 8. If you or your family have any questions, please call our office Monday- Friday,       9:00 a.m. - 4:30 p.m., at [phone], and ask to speak to one of the nurses.

## 2022-03-15 NOTE — PROGRESS NOTES
HISTORY OF PRESENT ILLNESS  Elodia Hernandez is a 78 y.o. female. Follow-up of paroxysmal atrial flutter fibrillation, status post cardioversion, CHF, MR    3/2022 Patient presents with c/o light headedness, nausea, consipation, shortness of breath and mild ankle edema, with fatigue. She has requested to schedule AMRITA as previously discussed with Dr. Fadumo Valdovinos to further evaluate Mitral valve. 8/21 recurrent A. fib with dyspnea worsening for about 4 months. 11/20 complains of dry cough    Follow-up  The history is provided by the patient and medical records. Associated symptoms include shortness of breath. Pertinent negatives include no chest pain and no headaches. Palpitations   The history is provided by the patient (Racing of heartbeat, thumps). This is a new problem. The current episode started more than 1 week ago (7/19; ). The problem has been resolved. The problem occurs every several days. On average, each episode lasts 2 minutes. The problem is associated with nothing (ADLs). Associated symptoms include malaise/fatigue (11/20 improving), nausea, dizziness (occasional) and shortness of breath. Pertinent negatives include no diaphoresis, no fever, no chest pain, no claudication, no orthopnea, no PND, no vomiting, no headaches and no cough. Shortness of Breath  The history is provided by the patient. This is a new problem. The problem occurs intermittently. The current episode started more than 1 week ago. The problem has not changed since onset. Pertinent negatives include no fever, no headaches, no cough, no wheezing, no PND, no orthopnea, no chest pain, no vomiting, no rash, no leg swelling and no claudication. The problem's precipitants include exercise (Walking within the house; 1/20 steps; 3/21 cleaning a lot; 8/21 steps, yard work; 10/21 in house sometimes). Review of Systems   Constitutional: Positive for malaise/fatigue (11/20 improving). Negative for chills, diaphoresis, fever and weight loss. HENT: Negative for nosebleeds. Eyes: Negative for discharge. Respiratory: Positive for shortness of breath. Negative for cough and wheezing. Cardiovascular: Positive for palpitations. Negative for chest pain, orthopnea, claudication, leg swelling and PND. Gastrointestinal: Positive for constipation and nausea. Negative for diarrhea and vomiting. Genitourinary: Negative for dysuria and hematuria. Musculoskeletal: Negative for joint pain. Skin: Negative for rash. Neurological: Positive for dizziness (occasional). Negative for seizures, loss of consciousness and headaches. Endo/Heme/Allergies: Negative for polydipsia. Does not bruise/bleed easily. Psychiatric/Behavioral: Negative for depression and substance abuse. The patient does not have insomnia. Allergies   Allergen Reactions    Lisinopril Cough       Past Medical History:   Diagnosis Date    Essential hypertension 11/23/2020       Family History   Problem Relation Age of Onset    Stroke Brother 47    Heart Attack Neg Hx        Social History     Tobacco Use    Smoking status: Never Smoker    Smokeless tobacco: Never Used   Substance Use Topics    Alcohol use: Not Currently    Drug use: Not Currently        Current Outpatient Medications   Medication Sig    metoprolol tartrate (LOPRESSOR) 25 mg tablet Take 1 Tablet by mouth two (2) times a day.  ergocalciferol (ERGOCALCIFEROL) 1,250 mcg (50,000 unit) capsule Take 50,000 Units by mouth every seven (7) days.  losartan (COZAAR) 25 mg tablet Take 1 Tablet by mouth daily.  Eliquis 5 mg tablet take 1 tablet by mouth twice a day    levothyroxine (SYNTHROID) 25 mcg tablet Take 1 Tablet by mouth Daily (before breakfast).  furosemide (LASIX) 20 mg tablet Take 1 Tablet by mouth daily as needed (Shortness of breath). Skip if SBP less than 105    levocetirizine (XYZAL) 5 mg tablet Take  by mouth. No current facility-administered medications for this visit.         Past Surgical History:   Procedure Laterality Date    HX LAP CHOLECYSTECTOMY      ID CARDIOVERSION ELECTIVE ARRHYTHMIA EXTERNAL N/A 11/10/2020    EP CARDIOVERSION performed by Rancho Cardoza MD at [facility] CATH LAB       Visit Vitals  /78   Pulse (!) 136   Ht 5' 5\" (1.651 m)   Wt 61.2 kg (135 lb)   SpO2 98%   BMI 22.47 kg/m²       Diagnostic Studies:  I have reviewed the relevant tests done on the patient and show as follows  EKG tracings reviewed by me today. EKG Results     Procedure 720 Value Units Date/Time    AMB POC EKG ROUTINE W/ 12 LEADS, INTER & REP [[id number]]     Order Status: Sent         XR Results (most recent):  No results found for this or any previous visit. 12/03/19   ECHO ADULT COMPLETE 12/03/2019 12/3/2019    Narrative · Left Ventricle: Normal cavity size and wall thickness. Mild systolic   dysfunction. Estimated left ventricular ejection fraction is 46 - 50%. Abnormal left ventricular wall motion. · Left Atrium: Severely dilated left atrium. · Right Ventricle: Moderately dilated right ventricle. Borderline low   systolic function. · Right Atrium: Moderately dilated right atrium. · Mitral Valve: Mitral valve thickening. Moderate mitral valve   regurgitation is present. · Tricuspid Valve: Mild tricuspid valve regurgitation is present. · Pulmonary Artery: There is no evidence of pulmonary hypertension. · Pulmonic Valve: Mild pulmonic valve regurgitation is present. · IVC/Hepatic Veins: Mildly elevated central venous pressure (5-10 mmHg);   IVC diameter is less than 21 mm and collapses less than 50% with   respiration. Signed by: Rancho Cardoza MD     12/03/19   NUCLEAR CARDIAC STRESS TEST 12/03/2019 12/4/2019    Narrative · Gated SPECT: Left ventricular function post-stress was normal.   Calculated ejection fraction is 57%. There is no evidence of transient   ischemic dilation (TID). The TID ratio is 0.8.   · Baseline ECG: Atrial fibrillation, non-specific ST-T wave abnormalities. · Negative stress test.  · Left ventricular perfusion is normal.  · Negative myocardial perfusion imaging. Myocardial perfusion imaging   supports a low risk stress test.        Signed by: Ever Baird MD   10/21 echo  Interpretation Summary    · LV: Calculated LVEF is 55%. Normal cavity size, wall thickness and systolic function (ejection fraction normal). Wall motion: normal. Inconclusive left ventricular diastolic function. · LA: Severely dilated left atrium. Left Atrium volume index is 65.68 mL/m2. · RV: Moderately dilated right ventricle. · RA: Moderately dilated right atrium. · MV: Moderate mitral valve regurgitation is present. · TV: Mild tricuspid valve regurgitation is present. Right Ventricular Arterial Pressure (RVSP) is 27 mmHg. Pulmonary hypertension not suggested by Doppler findings. Comparison Study Information    Prior Study    When compared to the previous study from 12/3/19, the overall left ventricular ejection fraction and wall motion now appear normal.         Ms. Ishmael Quintana has a reminder for a \"due or due soon\" health maintenance. I have asked that she contact her primary care provider for follow-up on this health maintenance. Physical Exam  Vitals and nursing note reviewed. Constitutional:       General: She is not in acute distress. Appearance: She is well-developed. HENT:      Head: Normocephalic and atraumatic. Mouth/Throat:      Dentition: Normal dentition. Eyes:      General: No scleral icterus. Right eye: No discharge. Left eye: No discharge. Neck:      Thyroid: No thyromegaly. Vascular: No carotid bruit or JVD. Cardiovascular:      Rate and Rhythm: Normal rate. Rhythm irregularly irregular. Pulses: Intact distal pulses. Heart sounds: Normal heart sounds, S1 normal and S2 normal. No murmur heard. No friction rub. No gallop.     Pulmonary:      Effort: Pulmonary effort is normal.      Breath sounds: Normal breath sounds. No wheezing or rales. Abdominal:      Palpations: Abdomen is soft. There is no mass. Tenderness: There is no abdominal tenderness. Musculoskeletal:      Cervical back: Neck supple. Right lower leg: No edema. Left lower leg: No edema. Lymphadenopathy:      Cervical:      Right cervical: No superficial cervical adenopathy. Left cervical: No superficial cervical adenopathy. Skin:     General: Skin is warm and dry. Findings: No rash. Neurological:      Mental Status: She is alert and oriented to person, place, and time. Psychiatric:         Behavior: Behavior normal.         ASSESSMENT and PLAN    Atrial Fibrillation CHADSVASC2 Score Stroke Risk:   78 y.o. 72 to 76  +1   female Female +1   CHF HX: No    + 0   HTN HX: No    + 0   Stroke/TIA/Thromboembolism No    +0   Vascular Disease HX: No    + 0   Diabetes Mellitus No    + 0   CHADSVASC 2 Score 2      Annual Stroke Risk 2.2%- moderate-high        MR: 12/19 mild to moderate; 10/21 moderate;  Cardiomyopathy: 12/19 45 to 50% EF, dilated LA, RA, RV 10/21  55% EF, dilated other chambers, no pulmonary hypertension;  Cardioversion: Successful 11/20; recurrent A. fib 8/21 with DAVIS;    11/20 status post cardioversion and is doing well. EKG with normal QTC. Tolerating sotalol well. Complains of dry cough and still has mild hypertension. Change lisinopril to losartan. Diet and exercise discussed. CHF is responding to medications fairly well. NYHA class II. Continue present dose of sotalol. Discontinue aspirin and continue Eliquis. 3/21 shortness of breath is overall improving further. Edema is rare. CHF is compensated NYHA class II. A. fib is staying into sinus bradycardia. Continue Eliquis. Blood pressure is mildly elevated but patient gets mildly dizzy post medications, so I will not increase the medicines any further yet. Cough has improved since lisinopril switch to losartan.   Check echo to follow-up on LV function as well as MR. Chest pain is atypical.  Had a negative stress test a year ago. No further work-up. .    8/19/2021 A. fib has recurred. She is feeling more dyspneic. Losartan held as she was feeling sick, likely had low pressure along with sotalol. Explained to try losartan again as sotalol is being discontinued and switched to amiodarone. Labs for amiodarone follow-up as ordered. Discussed with patient the choice between amiodarone and ablation. She would prefer medication first.  She is losing significant weight. Told her to see the PCP but will include basic labs today. Blood pressure to be monitored at home. Follow-up and Dispositions    · Return for Follow up with Dr. Naeem Reyes as previously scheduled. .       10/29/2021 persisting in A. fib despite oral amiodarone at home. Will need a cardioversion and will be scheduled in the next 1 to 2 weeks. Procedure discussed and explained to her again. Blood pressure is mildly elevated. Add low-dose losartan. CHF is compensated NYHA class II.  MR is stable. Follow clinically. Diagnoses and all orders for this visit:    1. Paroxysmal atrial fibrillation (HCC)  -     AMB POC EKG ROUTINE W/ 12 LEADS, INTER & REP    2. Chronic combined systolic and diastolic congestive heart failure ([address]    3. Essential hypertension    4. Nonrheumatic mitral valve regurgitation  -     ECHO AMRITA W OR WO CONTRAST; Future    5. Shortness of breath    Other orders  -     metoprolol tartrate (LOPRESSOR) 25 mg tablet; Take 1 Tablet by mouth two (2) times a day. Pertinent laboratory and test data reviewed and discussed with patient. See patient instructions also for other medical advice given    There are no discontinued medications. Follow-up and Dispositions    · Return for Follow up with Dr. Naeem Reyes as previously scheduled. .       1/21/2022 follow-up after cardioversion. Unfortunately, patient has gone back into A. fib.   This was explained to patient. Ablation procedure discussed and offered but she wants to wait on that. She also has moderate MR and continues to have CHF symptoms NYHA class II. Would recommend a AMRITA to better evaluate the mitral regurgitation and then consider appropriate procedure but she wants to think about it and will call us back when she is ready. Ejection fraction has improved on medications which will be continued. 3/2022  Patient seen for c/o shortness of breath, palpitations, intermittent edema, nausea, constipation and fatigue. In office EKG AFib with RVR. Will begin Metoprolol 25 mg/ BID for rate control. Symptoms likely related to RVR. To continue AC with Eliquis BID. Schedule AMRITA to further evaluate Mitral regurgitation. Advised to use lasix as needed for edema. Patient to maintain home b/p, hr chart - to send to office on Friday and will further adjust medications as indicated for rate control.

## 2022-03-16 ENCOUNTER — TRANSCRIBE ORDER (OUTPATIENT)
Dept: CARDIAC CATH/INVASIVE PROCEDURES | Age: 67
End: 2022-03-16

## 2022-03-16 DIAGNOSIS — I34.0 NONRHEUMATIC MITRAL VALVE INSUFFICIENCY: Primary | ICD-10-CM

## 2022-03-18 PROBLEM — I50.42 CHRONIC COMBINED SYSTOLIC AND DIASTOLIC CONGESTIVE HEART FAILURE (HCC): Status: ACTIVE | Noted: 2019-12-09

## 2022-03-18 PROBLEM — I48.0 PAROXYSMAL ATRIAL FIBRILLATION (HCC): Status: ACTIVE | Noted: 2020-11-08

## 2022-03-19 PROBLEM — I10 ESSENTIAL HYPERTENSION: Status: ACTIVE | Noted: 2020-11-23

## 2022-03-19 PROBLEM — I34.0 NONRHEUMATIC MITRAL VALVE REGURGITATION: Status: ACTIVE | Noted: 2019-12-09

## 2022-03-19 PROBLEM — I48.91 ATRIAL FIBRILLATION (HCC): Status: ACTIVE | Noted: 2019-12-09

## 2022-03-20 PROBLEM — R05.9 COUGH: Status: ACTIVE | Noted: 2021-03-01

## 2022-03-21 ENCOUNTER — TELEPHONE (OUTPATIENT)
Dept: CARDIOLOGY CLINIC | Age: 67
End: 2022-03-21

## 2022-03-21 NOTE — TELEPHONE ENCOUNTER
This is Dr Moe Odonnell patient. She was seen on 3/15 and started on Metoprolol 25 mg twice a day and she states her symptoms such as sob, nausea, lower back pain and swelling in feet have increased since starting this. Please advise.

## 2022-03-21 NOTE — TELEPHONE ENCOUNTER
Spoke with patient, Dr. Demetrio Pike advised to reduce metoprolol to half tablet twice a day and monitor symptoms. Patient had no questions or concerns at this time and will call back regarding symptoms.

## 2022-03-21 NOTE — TELEPHONE ENCOUNTER
Spoke with patient per Dr. Mundo Eli to reduce metoprolol to half tablet twice a day and monitor symptoms. She voices understanding and acceptance of this advice and will call back if any further questions or concerns.

## 2022-06-06 DIAGNOSIS — I10 ESSENTIAL HYPERTENSION: ICD-10-CM

## 2022-06-06 RX ORDER — LEVOTHYROXINE SODIUM 50 UG/1
50 TABLET ORAL
Qty: 90 TABLET | Refills: 2 | Status: SHIPPED | OUTPATIENT
Start: 2022-06-06

## 2022-06-06 RX ORDER — LEVOTHYROXINE SODIUM 50 UG/1
50 TABLET ORAL
COMMUNITY
End: 2022-06-06 | Stop reason: SDUPTHER

## 2022-06-06 NOTE — TELEPHONE ENCOUNTER
Requested Prescriptions     Pending Prescriptions Disp Refills    levothyroxine (synthroid) 50 mcg tablet 90 Tablet 2     Sig: Take 1 Tablet by mouth Daily (before breakfast).

## 2023-01-05 DIAGNOSIS — I48.0 PAROXYSMAL ATRIAL FIBRILLATION (HCC): ICD-10-CM

## 2023-01-05 RX ORDER — APIXABAN 5 MG/1
TABLET, FILM COATED ORAL
Qty: 60 TABLET | Refills: 2 | Status: SHIPPED | OUTPATIENT
Start: 2023-01-05

## 2023-04-07 RX ORDER — APIXABAN 5 MG/1
TABLET, FILM COATED ORAL
Qty: 60 TABLET | Refills: 5 | Status: SHIPPED | OUTPATIENT
Start: 2023-04-07

## 2023-08-14 RX ORDER — LEVOTHYROXINE SODIUM 0.05 MG/1
TABLET ORAL
Qty: 90 TABLET | Refills: 1 | OUTPATIENT
Start: 2023-08-14

## 2023-10-03 RX ORDER — LEVOTHYROXINE SODIUM 0.05 MG/1
TABLET ORAL
Qty: 90 TABLET | Refills: 1 | OUTPATIENT
Start: 2023-10-03

## 2023-10-09 RX ORDER — LEVOTHYROXINE SODIUM 0.05 MG/1
TABLET ORAL
Qty: 90 TABLET | Refills: 1 | OUTPATIENT
Start: 2023-10-09

## 2023-12-27 RX ORDER — APIXABAN 5 MG/1
TABLET, FILM COATED ORAL
Qty: 60 TABLET | Refills: 5 | OUTPATIENT
Start: 2023-12-27

## 2025-07-01 ENCOUNTER — TELEPHONE (OUTPATIENT)
Age: 70
End: 2025-07-01

## 2025-07-16 ENCOUNTER — OFFICE VISIT (OUTPATIENT)
Age: 70
End: 2025-07-16

## 2025-07-16 VITALS — HEIGHT: 65 IN | BODY MASS INDEX: 21.49 KG/M2 | WEIGHT: 129 LBS

## 2025-07-16 DIAGNOSIS — R22.31 MASS OF RIGHT HAND: Primary | ICD-10-CM

## 2025-07-16 NOTE — PROGRESS NOTES
Frank Myles is a 69 y.o. female right handed.  Worker's Compensation and legal considerations: none    Chief Complaint   Patient presents with    Hand Pain     Right      Pain Score:   0 - No pain    Subjective:     Initial HPI: Patient presents today with complaints of a mass in her right fourth webspace that is both dorsal and volar.  She reports it to be growing significantly in size over the past several months.  She reports it started approximately a year ago.    Date of onset: 2024  Injury: No  Prior Treatment:  No  Contributory history: None    ROS: Review of Systems - General ROS: negative except HPI    Past Medical History:   Diagnosis Date    Essential hypertension 11/23/2020       Past Surgical History:   Procedure Laterality Date    CARDIOVERSION ELECTIVE ARRHYTHMIA EXTERNAL N/A 11/10/2020    EP CARDIOVERSION performed by Kenyetta Hastings MD at [facility] CARDIAC CATH LAB    CHOLECYSTECTOMY, LAPAROSCOPIC          Current Outpatient Medications   Medication Sig Dispense Refill    ELIQUIS 5 MG TABS tablet take 1 tablet by mouth twice a day 60 tablet 5    ergocalciferol (ERGOCALCIFEROL) 1.25 MG (83907 UT) capsule Take 1 capsule by mouth every 7 days      furosemide (LASIX) 20 MG tablet Take 1 tablet by mouth daily as needed      levocetirizine (XYZAL) 5 MG tablet Take by mouth      levothyroxine (SYNTHROID) 50 MCG tablet Take 1 tablet by mouth every morning (before breakfast)      levothyroxine (SYNTHROID) 50 MCG tablet take 1 tablet by mouth EVERY MORNING BEFORE BREAKFAST 90 tablet 1    losartan (COZAAR) 25 MG tablet Take 25 mg by mouth daily (Patient not taking: Reported on 7/16/2025)      metoprolol tartrate (LOPRESSOR) 25 MG tablet Take 25 mg by mouth 2 times daily (Patient not taking: Reported on 7/16/2025)       No current facility-administered medications for this visit.       Allergies   Allergen Reactions    Lisinopril Cough         Ht 1.651 m (5' 5\")   Wt 58.5 kg (129 lb)   BMI 21.47 kg/m²